# Patient Record
Sex: FEMALE | Race: WHITE
[De-identification: names, ages, dates, MRNs, and addresses within clinical notes are randomized per-mention and may not be internally consistent; named-entity substitution may affect disease eponyms.]

---
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
## 2019-12-21 ENCOUNTER — RX RENEWAL (OUTPATIENT)
Age: 19
End: 2019-12-21

## 2024-01-04 ENCOUNTER — APPOINTMENT (OUTPATIENT)
Dept: CARDIOLOGY | Facility: CLINIC | Age: 24
End: 2024-01-04
Payer: COMMERCIAL

## 2024-01-04 VITALS
HEIGHT: 61 IN | OXYGEN SATURATION: 99 % | HEART RATE: 97 BPM | BODY MASS INDEX: 19.63 KG/M2 | DIASTOLIC BLOOD PRESSURE: 82 MMHG | WEIGHT: 104 LBS | SYSTOLIC BLOOD PRESSURE: 116 MMHG

## 2024-01-04 PROCEDURE — 99214 OFFICE O/P EST MOD 30 MIN: CPT | Mod: 25

## 2024-01-04 PROCEDURE — 93000 ELECTROCARDIOGRAM COMPLETE: CPT

## 2024-01-04 NOTE — HISTORY OF PRESENT ILLNESS
[FreeTextEntry1] : 22 y/o here to establish care ------------------- previously seen by Dr. Bowman in  reviewed note.  Seen for chest pain, attributed to costochondritis. also noted to have hyperlipidemia  HDL 56  TG 83 changes in lifestyle reccomended. -------------------  PCP referred here for hyperlipidemia.  Reviewed pt's cholesterol levels a few days ago:  HDL 82 TC 3.4  VLD 23 nonHDL 194 ASCVD risk  - not calculatable for age 23y assuming minimum age for calculation 40y, ASCVD risk =3.1%.  Pt had tried a  vegan diet for 2 yrs. in an attempt to lower cholesterol. Stopped Vegan 2 months ago, however even now has minimal meat servings - once every 2 weeks, occasional dairy,  no chest pain, dyspnea, palpitations able to exercise most days of week w/o symptoms. does pilates, no intense aerobic activity.  only on prilosec for GERD 40 mg daily  remote hx craniosystosis surgery at 3 months. ============ Reviewed family hx of CAD:  Dad - high cholesterol on meds for 10 yrs started at age 48 also paternal uncle age 52 pat grandfather 59 MI  from [place]. maternal uncle high calcium score on cholesterol meds. maternal grandmother 70s MI.  Pt states her mother, alley gilliam-3- is my pt. I reviewed her mother & her relatives family hx of CAD. MIAN Gilliam's brother had a high calcium score. though no history of  PCI. ============  Works in special education. seated during most of day pilates & wts not much cardio.  ECG NSR no ischemic changes.  -------------------

## 2024-01-04 NOTE — ASSESSMENT
[FreeTextEntry1] : A/P: *hyperlpidimeia *Atypical chest pain.  -conronary CT angio to exclude presence of obstructive CAD & determine burden of calcified & noncalcified plaque. -Emailed pt on diet & cholesterol at. [email or web address]  Return 2 weeks.

## 2024-01-18 ENCOUNTER — APPOINTMENT (OUTPATIENT)
Dept: CARDIOLOGY | Facility: CLINIC | Age: 24
End: 2024-01-18

## 2024-02-02 ENCOUNTER — APPOINTMENT (OUTPATIENT)
Dept: CT IMAGING | Facility: CLINIC | Age: 24
End: 2024-02-02

## 2024-02-12 ENCOUNTER — NON-APPOINTMENT (OUTPATIENT)
Age: 24
End: 2024-02-12

## 2024-04-24 ENCOUNTER — EMERGENCY (EMERGENCY)
Facility: HOSPITAL | Age: 24
LOS: 1 days | Discharge: HOME | End: 2024-04-24
Attending: EMERGENCY MEDICINE | Admitting: EMERGENCY MEDICINE
Payer: COMMERCIAL

## 2024-04-24 VITALS
SYSTOLIC BLOOD PRESSURE: 135 MMHG | OXYGEN SATURATION: 98 % | HEART RATE: 117 BPM | TEMPERATURE: 98 F | RESPIRATION RATE: 14 BRPM | DIASTOLIC BLOOD PRESSURE: 93 MMHG

## 2024-04-24 VITALS
RESPIRATION RATE: 16 BRPM | OXYGEN SATURATION: 98 % | SYSTOLIC BLOOD PRESSURE: 112 MMHG | DIASTOLIC BLOOD PRESSURE: 70 MMHG | HEART RATE: 94 BPM

## 2024-04-24 LAB
ALBUMIN SERPL ELPH-MCNC: 4.8 G/DL — SIGNIFICANT CHANGE UP (ref 3.3–5)
ALP SERPL-CCNC: 78 U/L — SIGNIFICANT CHANGE UP (ref 40–120)
ALT FLD-CCNC: 14 U/L — SIGNIFICANT CHANGE UP (ref 4–33)
ANION GAP SERPL CALC-SCNC: 13 MMOL/L — SIGNIFICANT CHANGE UP (ref 7–14)
APTT BLD: 30.2 SEC — SIGNIFICANT CHANGE UP (ref 24.5–35.6)
AST SERPL-CCNC: 16 U/L — SIGNIFICANT CHANGE UP (ref 4–32)
BASOPHILS # BLD AUTO: 0.04 K/UL — SIGNIFICANT CHANGE UP (ref 0–0.2)
BASOPHILS NFR BLD AUTO: 0.6 % — SIGNIFICANT CHANGE UP (ref 0–2)
BILIRUB SERPL-MCNC: 0.3 MG/DL — SIGNIFICANT CHANGE UP (ref 0.2–1.2)
BUN SERPL-MCNC: 8 MG/DL — SIGNIFICANT CHANGE UP (ref 7–23)
CALCIUM SERPL-MCNC: 9.5 MG/DL — SIGNIFICANT CHANGE UP (ref 8.4–10.5)
CHLORIDE SERPL-SCNC: 102 MMOL/L — SIGNIFICANT CHANGE UP (ref 98–107)
CO2 SERPL-SCNC: 25 MMOL/L — SIGNIFICANT CHANGE UP (ref 22–31)
CREAT SERPL-MCNC: 0.71 MG/DL — SIGNIFICANT CHANGE UP (ref 0.5–1.3)
EGFR: 122 ML/MIN/1.73M2 — SIGNIFICANT CHANGE UP
EOSINOPHIL # BLD AUTO: 0.23 K/UL — SIGNIFICANT CHANGE UP (ref 0–0.5)
EOSINOPHIL NFR BLD AUTO: 3.4 % — SIGNIFICANT CHANGE UP (ref 0–6)
FLUAV AG NPH QL: SIGNIFICANT CHANGE UP
FLUBV AG NPH QL: SIGNIFICANT CHANGE UP
GLUCOSE SERPL-MCNC: 82 MG/DL — SIGNIFICANT CHANGE UP (ref 70–99)
HCG SERPL-ACNC: <1 MIU/ML — SIGNIFICANT CHANGE UP
HCT VFR BLD CALC: 38.5 % — SIGNIFICANT CHANGE UP (ref 34.5–45)
HGB BLD-MCNC: 13.4 G/DL — SIGNIFICANT CHANGE UP (ref 11.5–15.5)
IANC: 3.86 K/UL — SIGNIFICANT CHANGE UP (ref 1.8–7.4)
IMM GRANULOCYTES NFR BLD AUTO: 0.3 % — SIGNIFICANT CHANGE UP (ref 0–0.9)
INR BLD: 1.02 RATIO — SIGNIFICANT CHANGE UP (ref 0.85–1.18)
LYMPHOCYTES # BLD AUTO: 2.28 K/UL — SIGNIFICANT CHANGE UP (ref 1–3.3)
LYMPHOCYTES # BLD AUTO: 33.9 % — SIGNIFICANT CHANGE UP (ref 13–44)
MCHC RBC-ENTMCNC: 28.9 PG — SIGNIFICANT CHANGE UP (ref 27–34)
MCHC RBC-ENTMCNC: 34.8 GM/DL — SIGNIFICANT CHANGE UP (ref 32–36)
MCV RBC AUTO: 83 FL — SIGNIFICANT CHANGE UP (ref 80–100)
MONOCYTES # BLD AUTO: 0.3 K/UL — SIGNIFICANT CHANGE UP (ref 0–0.9)
MONOCYTES NFR BLD AUTO: 4.5 % — SIGNIFICANT CHANGE UP (ref 2–14)
NEUTROPHILS # BLD AUTO: 3.86 K/UL — SIGNIFICANT CHANGE UP (ref 1.8–7.4)
NEUTROPHILS NFR BLD AUTO: 57.3 % — SIGNIFICANT CHANGE UP (ref 43–77)
NRBC # BLD: 0 /100 WBCS — SIGNIFICANT CHANGE UP (ref 0–0)
NRBC # FLD: 0 K/UL — SIGNIFICANT CHANGE UP (ref 0–0)
PLATELET # BLD AUTO: 413 K/UL — HIGH (ref 150–400)
POTASSIUM SERPL-MCNC: 4 MMOL/L — SIGNIFICANT CHANGE UP (ref 3.5–5.3)
POTASSIUM SERPL-SCNC: 4 MMOL/L — SIGNIFICANT CHANGE UP (ref 3.5–5.3)
PROT SERPL-MCNC: 7.8 G/DL — SIGNIFICANT CHANGE UP (ref 6–8.3)
PROTHROM AB SERPL-ACNC: 11.5 SEC — SIGNIFICANT CHANGE UP (ref 9.5–13)
RBC # BLD: 4.64 M/UL — SIGNIFICANT CHANGE UP (ref 3.8–5.2)
RBC # FLD: 12.2 % — SIGNIFICANT CHANGE UP (ref 10.3–14.5)
RSV RNA NPH QL NAA+NON-PROBE: SIGNIFICANT CHANGE UP
SARS-COV-2 RNA SPEC QL NAA+PROBE: SIGNIFICANT CHANGE UP
SODIUM SERPL-SCNC: 140 MMOL/L — SIGNIFICANT CHANGE UP (ref 135–145)
WBC # BLD: 6.73 K/UL — SIGNIFICANT CHANGE UP (ref 3.8–10.5)
WBC # FLD AUTO: 6.73 K/UL — SIGNIFICANT CHANGE UP (ref 3.8–10.5)

## 2024-04-24 PROCEDURE — 70450 CT HEAD/BRAIN W/O DYE: CPT | Mod: 26,MC

## 2024-04-24 PROCEDURE — 93010 ELECTROCARDIOGRAM REPORT: CPT

## 2024-04-24 PROCEDURE — 99284 EMERGENCY DEPT VISIT MOD MDM: CPT

## 2024-04-24 RX ORDER — METOCLOPRAMIDE HCL 10 MG
10 TABLET ORAL ONCE
Refills: 0 | Status: COMPLETED | OUTPATIENT
Start: 2024-04-24 | End: 2024-04-24

## 2024-04-24 RX ORDER — KETOROLAC TROMETHAMINE 30 MG/ML
15 SYRINGE (ML) INJECTION ONCE
Refills: 0 | Status: DISCONTINUED | OUTPATIENT
Start: 2024-04-24 | End: 2024-04-24

## 2024-04-24 RX ORDER — SODIUM CHLORIDE 9 MG/ML
1000 INJECTION INTRAMUSCULAR; INTRAVENOUS; SUBCUTANEOUS ONCE
Refills: 0 | Status: COMPLETED | OUTPATIENT
Start: 2024-04-24 | End: 2024-04-24

## 2024-04-24 RX ADMIN — Medication 10 MILLIGRAM(S): at 12:00

## 2024-04-24 RX ADMIN — SODIUM CHLORIDE 1000 MILLILITER(S): 9 INJECTION INTRAMUSCULAR; INTRAVENOUS; SUBCUTANEOUS at 12:01

## 2024-04-24 RX ADMIN — Medication 15 MILLIGRAM(S): at 12:00

## 2024-04-24 NOTE — ED PROVIDER NOTE - NSFOLLOWUPINSTRUCTIONS_ED_ALL_ED_FT
- Please follow up with your Neurologist within 1-2 weeks. Bring your results from today.    - Tylenol up to 650 mg every 6 hours as needed for pain and/or Ibuprofen up to 400 mg every 6 hours as needed for pain.    - Take your prescribed medications for headaches as indicated.    - Stay well hydrated.     - Be sure to return to the ED if you develop new, worsening, or any distressing symptoms.

## 2024-04-24 NOTE — ED PROVIDER NOTE - PHYSICAL EXAMINATION
Physical Exam:   GENERAL: no acute distress, non-toxic appearing  HEENT: normal conjunctiva, oral mucosa moist  CARDIAC: regular rate and regular rhythm, normal S1 and S2, no appreciable murmurs  PULM: no increased work of breathing  GI: abdomen nondistended, soft, nontender  : no suprapubic tenderness  NEURO: alert and oriented x 3, normal speech, CN's 3-12 intact, EOMI without nystagmus, CLAUDIA, 5/5 strength all extremities, finger to nose intact  MSK: no visible deformities, no peripheral edema  SKIN: no visible rashes  PSYCH: appropriate mood and affect

## 2024-04-24 NOTE — ED PROVIDER NOTE - OBJECTIVE STATEMENT
23F PMH migraines (on a triptan, follows with neuro) presents to the ED with 2 weeks of progressing migraines. Mostly R sided, throbbing headache with some R facial parasthesias, not improved with meds she's prescribed for migraines. Pt reports some slight blurry vision intermittently, and sometimes the headache goes to the L side of her head. Pt denies any weakness/numbness/tingling in extremities, pt able to ambulate into the ED today. Pt having difficulty doing school work over the past couple weeks because of headache. Pt sent to ED by neurologist for evaluation. Pt didn't take any OTC medications pta. Denies any fevers/chills, uri sxs, cough, chest pain/sob/palpitations, abd pain, diarrhea, dark/bloody stools, urinary sxs.

## 2024-04-24 NOTE — ED PROVIDER NOTE - NSPTACCESSSVCSAPPTDETAILS_ED_ALL_ED_FT
f/u within 1 month with Neurologist to establish care for migraine headaches (pt has neurologist though wants a new one)

## 2024-04-24 NOTE — ED ADULT NURSE NOTE - OBJECTIVE STATEMENT
Pt received to intake. pt is AxO 3 and ambulatory. Pt c/o migraines x 2 weeks. pt states her headaches got worse today and was told by her neurologist to come for  CT. pt respirations even and unlabored. pt endorses no relief with use of prescribed medications for her headaches. Pt endorses occasional blurred vision. no other neuro deficits noted on assessment. Pt denies chest pain, SOB, n/v/d, abdominal pain, or fever like symptoms. Phx migraines, eosinophilic esophagitis, GERD. pt has a 20G tot he left AC. pt medicated as prescribed. All labs sent ot the lab. pt plan of care ongoing.

## 2024-04-24 NOTE — ED PROVIDER NOTE - CLINICAL SUMMARY MEDICAL DECISION MAKING FREE TEXT BOX
Talia: Likely migraine flare not improving with meds at home. Sent in by neuro. Will obtain imaging, check labs, give HA cocktail. Reassess.

## 2024-04-24 NOTE — ED PROVIDER NOTE - ATTENDING CONTRIBUTION TO CARE
DR. FREDERICK, ATTENDING MD-  I performed a face to face bedside interview with the patient regarding history of present illness, review of symptoms and past medical history. I completed an independent physical exam.  I have discussed the patient's plan of care with the fellow.  Documentation as above in the note.    22 y/o female h/o migraines with persistent ha x2 wks not responding to typical home meds.  No red flags, however, given prolonged sx will obtain ct head give pain med reassess.

## 2024-04-24 NOTE — ED ADULT NURSE NOTE - NSFALLUNIVINTERV_ED_ALL_ED
Bed/Stretcher in lowest position, wheels locked, appropriate side rails in place/Call bell, personal items and telephone in reach/Instruct patient to call for assistance before getting out of bed/chair/stretcher/Non-slip footwear applied when patient is off stretcher/[place] to call system/Physically safe environment - no spills, clutter or unnecessary equipment/Purposeful proactive rounding/Room/bathroom lighting operational, light cord in reach

## 2024-04-24 NOTE — ED ADULT TRIAGE NOTE - CHIEF COMPLAINT QUOTE
Patient c/o headache associated with blurry vision and R facial numbness x 3 weeks, sent to ED by neurologist. Reports nausea/vomiting last night and dizziness right now. Denies fever, chest pain, SOB. Phx migraines, eosinophilic esophagitis, GERD

## 2024-04-24 NOTE — ED PROVIDER NOTE - PATIENT PORTAL LINK FT
You can access the FollowMyHealth Patient Portal offered by [facility] by registering at the following website: [email or web address]. By joining [facility]’s FollowMyHealth portal, you will also be able to view your health information using other applications (apps) compatible with our system.

## 2024-04-30 ENCOUNTER — OFFICE (OUTPATIENT)
Dept: URBAN - METROPOLITAN AREA CLINIC 109 | Facility: CLINIC | Age: 24
Setting detail: OPHTHALMOLOGY
End: 2024-04-30
Payer: COMMERCIAL

## 2024-04-30 ENCOUNTER — TRANSCRIPTION ENCOUNTER (OUTPATIENT)
Age: 24
End: 2024-04-30

## 2024-04-30 ENCOUNTER — APPOINTMENT (OUTPATIENT)
Dept: OTOLARYNGOLOGY | Facility: CLINIC | Age: 24
End: 2024-04-30
Payer: COMMERCIAL

## 2024-04-30 VITALS
HEIGHT: 62 IN | DIASTOLIC BLOOD PRESSURE: 83 MMHG | HEART RATE: 82 BPM | WEIGHT: 100 LBS | OXYGEN SATURATION: 99 % | BODY MASS INDEX: 18.4 KG/M2 | SYSTOLIC BLOOD PRESSURE: 116 MMHG

## 2024-04-30 DIAGNOSIS — G43.109: ICD-10-CM

## 2024-04-30 DIAGNOSIS — R51.9 HEADACHE, UNSPECIFIED: ICD-10-CM

## 2024-04-30 DIAGNOSIS — J34.89 OTHER SPECIFIED DISORDERS OF NOSE AND NASAL SINUSES: ICD-10-CM

## 2024-04-30 DIAGNOSIS — H53.423: ICD-10-CM

## 2024-04-30 PROCEDURE — 92004 COMPRE OPH EXAM NEW PT 1/>: CPT | Performed by: OPHTHALMOLOGY

## 2024-04-30 PROCEDURE — 92083 EXTENDED VISUAL FIELD XM: CPT | Performed by: OPHTHALMOLOGY

## 2024-04-30 PROCEDURE — 31231 NASAL ENDOSCOPY DX: CPT

## 2024-04-30 PROCEDURE — 99204 OFFICE O/P NEW MOD 45 MIN: CPT | Mod: 25

## 2024-04-30 RX ORDER — OMEPRAZOLE MAGNESIUM 40 MG/1
40 CAPSULE, DELAYED RELEASE ORAL TWICE DAILY
Refills: 0 | Status: ACTIVE | COMMUNITY

## 2024-04-30 NOTE — END OF VISIT
[FreeTextEntry3] : I personally saw and examined  the patient in detail.  I spoke to SHIRIN Ponce regarding the assessment and plan of care. I performed the procedures and relevant physical exam.  I have reviewed the above assessment and plan of care and I agree.  I have made changes to the body of the note wherever necessary and appropriate

## 2024-04-30 NOTE — PROCEDURE
[FreeTextEntry6] : Procedure performed: Nasal Endoscopy- Diagnostic   Pre-op indication(s): nasal congestion  Post-op indication(s): nasal congestion    Verbal and/or written consent obtained from patient  Anterior rhinoscopy insufficient to account for symptoms  Scope #: 3,  flexible fiber optic telescope   The scope was introduced in the nasal passage between the middle and inferior turbinates to exam the inferior portion of the middle meatus and the fontanelle, as well as the maxillary ostia.  Next, the scope was passed medically and posteriorly to the middle turbinates to examine the sphenoethmoid recess and the superior turbinate region.    Upon visualization the finders are as follows:   Nasal Septum: sigmoidal septal deviation   Bilateral - Mucosa: boggy turbinates, Mucous: scant, Polyp: not seen, Inferior Turbinate: boggy, Middle Turbinate: normal, Superior Turbinate: normal, Inferior Meatus: narrow, Middle Meatus: narrow, Super Meatus:normal, Sphenoethmoidal Recess: clear

## 2024-04-30 NOTE — ASSESSMENT
[FreeTextEntry1] : 23 year old female with hx of rhinoplasty and refractory debilitating constant migraines, presents after MRI indicated sinuses. No polyps seen on scope. these sound like migranes however also may have sinusitis, and sinusitis may be a trigger for the migraine. will treat and see if improved.  - We will proceed with a regiment of decongestants, antibiotics and irrigation to try to break the cycle of inflation and obstruction. this will treat the acute symptoms and will hopefully allow for maintenance therapy to reach disease areas and avoid further intervention. - Will start Flonase. A topical steroid reduce mucosal swelling, illustrated appropriate use and how to reduce the risk of bleeding    - Nasal irrigation and showed how to use it to maximize effectiveness - allergy avoidance and precautions  - follow up in 1 month  - will bring in MRI disc to review images and report  - will consider ordering CT scan of sinuses for further evaluation if symptoms persists

## 2024-04-30 NOTE — PHYSICAL EXAM
[Nasal Endoscopy Performed] : nasal endoscopy was performed, see procedure section for findings [Normal] : normal appearance, well groomed, well nourished, and in no acute distress

## 2024-04-30 NOTE — HISTORY OF PRESENT ILLNESS
[de-identified] : 23 year old female with hx of migraines [facility] and was referred to ENT for evaluation of sinuses. Got a rhinoplasty 4 years ago by Dr Ureña, for cosmetics. Gets intermittent stuffy nose but states its not bothersome. Got allergy tested which indicated she was allergic to pollen, dust, grass and trees. Is taking alllegra for allergies but unsure if it is helping. Gets pressure at her cheeks with migraines, worse on right side. Sometimes also gets pressure at forehead and nose with migraines. Migraine medications do not help, migraine cocktail helped her in ER last week.  States with migraines, nose and ears get very clogged and stuffed. Cannot hear from ear on side of migraine for days at a time. Has tried flonase in the past but not using currently. Has had 2-3 sinus infections in last 12 months requiring antibiotics, has not taken steroids. Most recent was 2 months ago. Has had migraines daily for last month.

## 2024-04-30 NOTE — CONSULT LETTER
[Please see my note below.] : Please see my note below. [FreeTextEntry1] : Dear Dr. JUDITH MCCRAY    I had the pleasure of evaluating your patient JENNIFER FERREIRA, thank you for allowing us to participate in their care. please see full note detailing our visit below.  If you have any questions, please do not hesitate to call me and I would be happy to discuss further.       Librado Arredondo M.D.   Attending Physician,     Department of Otolaryngology - Head and Neck Surgery   [facility]    Office: [phone]   Fax: [phone]

## 2024-05-02 ENCOUNTER — APPOINTMENT (OUTPATIENT)
Dept: NEUROLOGY | Facility: CLINIC | Age: 24
End: 2024-05-02
Payer: COMMERCIAL

## 2024-05-02 DIAGNOSIS — G43.909 MIGRAINE, UNSPECIFIED, NOT INTRACTABLE, W/OUT STATUS MIGRAINOSUS: ICD-10-CM

## 2024-05-02 PROCEDURE — 99203 OFFICE O/P NEW LOW 30 MIN: CPT

## 2024-05-02 NOTE — HISTORY OF PRESENT ILLNESS
[FreeTextEntry1] : [This is a telehealth 2-way video visit] Verbal consent given on May 02, 2024  at  9:30AM by JENNIFER FERREIRA Patient location: [address] [place] [address] Provider location: Medical office   \[facility] [place] [facility]\"" (initial visit May 02, 2024)- JENNIFER FERREIRA is a 23 year old woman self-referred for migraine headaches.   She would usually get migraine headaches once a year.  In the last 6 months, they have become more frequent, daily in the last month.  She saw a neurologist with [facility], she was prescribed rizatriptan- caused s/e- chest pains and n/v. She was switched to ubrelvy and increased to 100 and still did not work. She was in ED with headache last week for severe headache, CTH was normal ([facility]).  She had a brain MRI 1 month ago and it reportedly normal and reported "sinus disease".  Also saw Ophthalmologist 2 days ago, Dr Simpson ([facility]), and everything was okay. Saw ENT, Dr Librado Arredondo, prescribed steroids for sinusitis.- has not started yet.   Headaches: around the right eye and radiate to temple and top of head. + photophobia and phonophobia. Extremely nauseous and little dizzy. Right eye can get blurry. no visual/sensory/motor auras. no speech disturbances.   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

## 2024-05-02 NOTE — REASON FOR VISIT
[Initial Evaluation] : an initial evaluation [FreeTextEntry1] : Migraines 11/30 hgb 10.3, hct 30.4, bun 55, creat 4.69, k 5.5, alb 3.2, alk phos 122, eGFR 11, probnp [id number]

## 2024-05-02 NOTE — ASSESSMENT
[FreeTextEntry1] : Assessment/Plan:  23 year old female w/ hx of migraine headaches, now experiencing worsening intensity and frequency of headaches c/w status migrainous.   Recommendation:- [] Over the counter preventative therapies discussed, which include Magnesium 400 mg QD (discussed potential side effect of diarrhea) and riboflavin 400 mg QD. [] Agree with starting prednisone taper (prescribed by ENT) [] Will prescribe Nurtec 75 mg QD PRN for headaches. If she tolerates it well, can consider every other day dosing as preventative.  [] Pt to send us disc and report of recent brain MRI [] If headaches continue to persist, may consider inpatient DHE therapy.    Headache education provided: [] Stay well hydrated [] Limit excessive caffeine and alcohol intake [] Maintain good sleep hygiene. Follow a consistent sleep and wake schedule.  [] Practice good eating habits. Avoid skipping meals.  [] Try to avoid any known triggers [] Avoid excessive use of over the counter pain medications, as they can cause medication overuse headaches  [] Keep a headache diary [] Relaxation techniques, biofeedback, massage therapy, acupunctures, and heating pads may be effective   Return to clinic 4-6 weeks (in person)  The above plan was discussed with JENNIFER ALANIZLESON in great detail.  JENNIFER FERREIRA verbalized understanding and agrees with plan as detailed above. Patient was provided education and counselling on current diagnosis/symptoms. She was advised to call our clinic at [phone] for any new or worsening symptoms, or with any questions or concerns. JENNIFER FERREIRA expressed understanding and all her questions/concerns were addressed.  Bonita Zavaleta M.D

## 2024-05-07 ENCOUNTER — APPOINTMENT (OUTPATIENT)
Dept: CARDIOLOGY | Facility: CLINIC | Age: 24
End: 2024-05-07
Payer: COMMERCIAL

## 2024-05-07 ENCOUNTER — NON-APPOINTMENT (OUTPATIENT)
Age: 24
End: 2024-05-07

## 2024-05-07 VITALS
WEIGHT: 99 LBS | OXYGEN SATURATION: 99 % | HEART RATE: 105 BPM | SYSTOLIC BLOOD PRESSURE: 104 MMHG | DIASTOLIC BLOOD PRESSURE: 66 MMHG | HEIGHT: 62 IN | BODY MASS INDEX: 18.22 KG/M2

## 2024-05-07 DIAGNOSIS — I47.10 SUPRAVENTRICULAR TACHYCARDIA, UNSPECIFIED: ICD-10-CM

## 2024-05-07 PROCEDURE — 99214 OFFICE O/P EST MOD 30 MIN: CPT

## 2024-05-07 PROCEDURE — 93000 ELECTROCARDIOGRAM COMPLETE: CPT

## 2024-05-07 PROCEDURE — G2211 COMPLEX E/M VISIT ADD ON: CPT

## 2024-05-07 RX ORDER — AMOXICILLIN AND CLAVULANATE POTASSIUM 875; 125 MG/1; MG/1
875-125 TABLET, COATED ORAL
Qty: 20 | Refills: 0 | Status: DISCONTINUED | COMMUNITY
Start: 2024-04-30 | End: 2024-05-07

## 2024-05-07 RX ORDER — NORGESTREL AND ETHINYL ESTRADIOL 0.3-0.03MG
0.3-3 KIT ORAL DAILY
Qty: 3 | Refills: 3 | Status: DISCONTINUED | COMMUNITY
Start: 2018-12-17 | End: 2024-05-07

## 2024-05-07 RX ORDER — LORATADINE 5 MG/5 ML
0.05 SOLUTION, ORAL ORAL
Qty: 1 | Refills: 0 | Status: DISCONTINUED | COMMUNITY
Start: 2024-04-30 | End: 2024-05-07

## 2024-05-07 RX ORDER — NORGESTREL AND ETHINYL ESTRADIOL 0.3-0.03MG
0.3-3 KIT ORAL
Refills: 0 | Status: DISCONTINUED | COMMUNITY
Start: 2018-05-21 | End: 2024-05-07

## 2024-05-07 RX ORDER — FLUTICASONE PROPIONATE 220 UG/1
220 AEROSOL, METERED RESPIRATORY (INHALATION)
Refills: 0 | Status: DISCONTINUED | COMMUNITY
End: 2024-05-07

## 2024-05-07 RX ORDER — PREDNISONE 10 MG/1
10 TABLET ORAL
Qty: 27 | Refills: 0 | Status: DISCONTINUED | COMMUNITY
Start: 2024-04-30 | End: 2024-05-07

## 2024-05-07 NOTE — ASSESSMENT
[FreeTextEntry1] : A/P:  *SVT -records from [place] -Echo to confirm no structural abnormalities  *Chest pain -doubt cardiac etiology -Echo orded/  *hyperlipidemia - - CT coronary angio.

## 2024-05-07 NOTE — HISTORY OF PRESENT ILLNESS
[FreeTextEntry1] : [id number]  JENNIFER FERREIRA  Aug  4 2000 [phone]   saw neuro for migraine also saw ENT for possible sinus infection prednisone & augmentin was prescribed for sinus infection within 30 minutes had hives throughout body, throat swelled. Mom took to [facility]  epi given & benadyrl & pepside symptoms resolved HRs went to 180s abrupt after symptoms resolved. Was told to bear down & symptoms resolved. Was kept overnight for observation.  Since discharge chest discomfort L sided & in shoulder 1-2 days after discharge constant discomfort. no changes w/ palpation, inspiration, breathing, worse w/lying flat.  No dyspnea, no palpitations.

## 2024-05-10 ENCOUNTER — APPOINTMENT (OUTPATIENT)
Dept: CARDIOLOGY | Facility: CLINIC | Age: 24
End: 2024-05-10

## 2024-05-21 ENCOUNTER — APPOINTMENT (OUTPATIENT)
Dept: CARDIOLOGY | Facility: CLINIC | Age: 24
End: 2024-05-21

## 2024-06-03 ENCOUNTER — NON-APPOINTMENT (OUTPATIENT)
Age: 24
End: 2024-06-03

## 2024-06-04 ENCOUNTER — APPOINTMENT (OUTPATIENT)
Dept: OTOLARYNGOLOGY | Facility: CLINIC | Age: 24
End: 2024-06-04

## 2024-06-13 ENCOUNTER — EMERGENCY (EMERGENCY)
Facility: HOSPITAL | Age: 24
LOS: 1 days | Discharge: HOME | End: 2024-06-13
Attending: EMERGENCY MEDICINE | Admitting: EMERGENCY MEDICINE
Payer: COMMERCIAL

## 2024-06-13 VITALS
OXYGEN SATURATION: 100 % | SYSTOLIC BLOOD PRESSURE: 142 MMHG | DIASTOLIC BLOOD PRESSURE: 85 MMHG | TEMPERATURE: 98 F | RESPIRATION RATE: 16 BRPM

## 2024-06-13 VITALS
SYSTOLIC BLOOD PRESSURE: 140 MMHG | DIASTOLIC BLOOD PRESSURE: 84 MMHG | TEMPERATURE: 98 F | OXYGEN SATURATION: 98 % | RESPIRATION RATE: 16 BRPM | HEART RATE: 115 BPM

## 2024-06-13 LAB
ALBUMIN SERPL ELPH-MCNC: 4.5 G/DL — SIGNIFICANT CHANGE UP (ref 3.3–5)
ALP SERPL-CCNC: 68 U/L — SIGNIFICANT CHANGE UP (ref 40–120)
ALT FLD-CCNC: 24 U/L — SIGNIFICANT CHANGE UP (ref 4–33)
ANION GAP SERPL CALC-SCNC: 8 MMOL/L — SIGNIFICANT CHANGE UP (ref 7–14)
AST SERPL-CCNC: 72 U/L — HIGH (ref 4–32)
BASOPHILS # BLD AUTO: 0.03 K/UL — SIGNIFICANT CHANGE UP (ref 0–0.2)
BASOPHILS NFR BLD AUTO: 0.4 % — SIGNIFICANT CHANGE UP (ref 0–2)
BILIRUB SERPL-MCNC: 0.4 MG/DL — SIGNIFICANT CHANGE UP (ref 0.2–1.2)
BUN SERPL-MCNC: 8 MG/DL — SIGNIFICANT CHANGE UP (ref 7–23)
CALCIUM SERPL-MCNC: 9.5 MG/DL — SIGNIFICANT CHANGE UP (ref 8.4–10.5)
CHLORIDE SERPL-SCNC: 103 MMOL/L — SIGNIFICANT CHANGE UP (ref 98–107)
CO2 SERPL-SCNC: 25 MMOL/L — SIGNIFICANT CHANGE UP (ref 22–31)
CREAT SERPL-MCNC: 0.71 MG/DL — SIGNIFICANT CHANGE UP (ref 0.5–1.3)
EGFR: 122 ML/MIN/1.73M2 — SIGNIFICANT CHANGE UP
EOSINOPHIL # BLD AUTO: 0.09 K/UL — SIGNIFICANT CHANGE UP (ref 0–0.5)
EOSINOPHIL NFR BLD AUTO: 1.3 % — SIGNIFICANT CHANGE UP (ref 0–6)
GLUCOSE SERPL-MCNC: 82 MG/DL — SIGNIFICANT CHANGE UP (ref 70–99)
HCT VFR BLD CALC: 37.8 % — SIGNIFICANT CHANGE UP (ref 34.5–45)
HGB BLD-MCNC: 12.7 G/DL — SIGNIFICANT CHANGE UP (ref 11.5–15.5)
IANC: 4.46 K/UL — SIGNIFICANT CHANGE UP (ref 1.8–7.4)
IMM GRANULOCYTES NFR BLD AUTO: 0.4 % — SIGNIFICANT CHANGE UP (ref 0–0.9)
LYMPHOCYTES # BLD AUTO: 2.21 K/UL — SIGNIFICANT CHANGE UP (ref 1–3.3)
LYMPHOCYTES # BLD AUTO: 30.7 % — SIGNIFICANT CHANGE UP (ref 13–44)
MAGNESIUM SERPL-MCNC: 2.2 MG/DL — SIGNIFICANT CHANGE UP (ref 1.6–2.6)
MCHC RBC-ENTMCNC: 28.5 PG — SIGNIFICANT CHANGE UP (ref 27–34)
MCHC RBC-ENTMCNC: 33.6 GM/DL — SIGNIFICANT CHANGE UP (ref 32–36)
MCV RBC AUTO: 84.8 FL — SIGNIFICANT CHANGE UP (ref 80–100)
MONOCYTES # BLD AUTO: 0.38 K/UL — SIGNIFICANT CHANGE UP (ref 0–0.9)
MONOCYTES NFR BLD AUTO: 5.3 % — SIGNIFICANT CHANGE UP (ref 2–14)
NEUTROPHILS # BLD AUTO: 4.46 K/UL — SIGNIFICANT CHANGE UP (ref 1.8–7.4)
NEUTROPHILS NFR BLD AUTO: 61.9 % — SIGNIFICANT CHANGE UP (ref 43–77)
NRBC # BLD: 0 /100 WBCS — SIGNIFICANT CHANGE UP (ref 0–0)
NRBC # FLD: 0 K/UL — SIGNIFICANT CHANGE UP (ref 0–0)
PHOSPHATE SERPL-MCNC: 2.8 MG/DL — SIGNIFICANT CHANGE UP (ref 2.5–4.5)
PLATELET # BLD AUTO: 420 K/UL — HIGH (ref 150–400)
POTASSIUM SERPL-MCNC: 4.1 MMOL/L — SIGNIFICANT CHANGE UP (ref 3.5–5.3)
POTASSIUM SERPL-SCNC: 4.1 MMOL/L — SIGNIFICANT CHANGE UP (ref 3.5–5.3)
PROT SERPL-MCNC: 7.4 G/DL — SIGNIFICANT CHANGE UP (ref 6–8.3)
RBC # BLD: 4.46 M/UL — SIGNIFICANT CHANGE UP (ref 3.8–5.2)
RBC # FLD: 11.7 % — SIGNIFICANT CHANGE UP (ref 10.3–14.5)
SODIUM SERPL-SCNC: 136 MMOL/L — SIGNIFICANT CHANGE UP (ref 135–145)
WBC # BLD: 7.2 K/UL — SIGNIFICANT CHANGE UP (ref 3.8–10.5)
WBC # FLD AUTO: 7.2 K/UL — SIGNIFICANT CHANGE UP (ref 3.8–10.5)

## 2024-06-13 PROCEDURE — 70496 CT ANGIOGRAPHY HEAD: CPT | Mod: 26,MC

## 2024-06-13 PROCEDURE — 70498 CT ANGIOGRAPHY NECK: CPT | Mod: 26,MC

## 2024-06-13 PROCEDURE — 93010 ELECTROCARDIOGRAM REPORT: CPT

## 2024-06-13 PROCEDURE — 99223 1ST HOSP IP/OBS HIGH 75: CPT

## 2024-06-13 RX ORDER — SODIUM CHLORIDE 9 MG/ML
1000 INJECTION INTRAMUSCULAR; INTRAVENOUS; SUBCUTANEOUS
Refills: 0 | Status: DISCONTINUED | OUTPATIENT
Start: 2024-06-13 | End: 2024-06-16

## 2024-06-13 RX ORDER — METOCLOPRAMIDE HCL 10 MG
10 TABLET ORAL ONCE
Refills: 0 | Status: COMPLETED | OUTPATIENT
Start: 2024-06-13 | End: 2024-06-13

## 2024-06-13 RX ORDER — SODIUM CHLORIDE 9 MG/ML
1000 INJECTION, SOLUTION INTRAVENOUS ONCE
Refills: 0 | Status: COMPLETED | OUTPATIENT
Start: 2024-06-13 | End: 2024-06-13

## 2024-06-13 RX ORDER — DIPHENHYDRAMINE HCL 50 MG
25 CAPSULE ORAL EVERY 8 HOURS
Refills: 0 | Status: DISCONTINUED | OUTPATIENT
Start: 2024-06-13 | End: 2024-06-16

## 2024-06-13 RX ORDER — KETOROLAC TROMETHAMINE 30 MG/ML
15 SYRINGE (ML) INJECTION ONCE
Refills: 0 | Status: DISCONTINUED | OUTPATIENT
Start: 2024-06-13 | End: 2024-06-13

## 2024-06-13 RX ORDER — METOCLOPRAMIDE HCL 10 MG
10 TABLET ORAL EVERY 8 HOURS
Refills: 0 | Status: DISCONTINUED | OUTPATIENT
Start: 2024-06-13 | End: 2024-06-16

## 2024-06-13 RX ORDER — KETOROLAC TROMETHAMINE 30 MG/ML
30 SYRINGE (ML) INJECTION EVERY 8 HOURS
Refills: 0 | Status: DISCONTINUED | OUTPATIENT
Start: 2024-06-13 | End: 2024-06-13

## 2024-06-13 RX ORDER — ACETAMINOPHEN 500 MG
1000 TABLET ORAL ONCE
Refills: 0 | Status: COMPLETED | OUTPATIENT
Start: 2024-06-13 | End: 2024-06-13

## 2024-06-13 RX ORDER — MAGNESIUM SULFATE 500 MG/ML
2 VIAL (ML) INJECTION ONCE
Refills: 0 | Status: COMPLETED | OUTPATIENT
Start: 2024-06-13 | End: 2024-06-13

## 2024-06-13 RX ADMIN — Medication 400 MILLIGRAM(S): at 10:47

## 2024-06-13 RX ADMIN — Medication 15 MILLIGRAM(S): at 10:47

## 2024-06-13 RX ADMIN — SODIUM CHLORIDE 1000 MILLILITER(S): 9 INJECTION, SOLUTION INTRAVENOUS at 10:47

## 2024-06-13 RX ADMIN — Medication 10 MILLIGRAM(S): at 17:08

## 2024-06-13 RX ADMIN — Medication 25 MILLIGRAM(S): at 17:04

## 2024-06-13 RX ADMIN — Medication 30 MILLIGRAM(S): at 17:07

## 2024-06-13 RX ADMIN — SODIUM CHLORIDE 125 MILLILITER(S): 9 INJECTION INTRAMUSCULAR; INTRAVENOUS; SUBCUTANEOUS at 16:11

## 2024-06-13 RX ADMIN — Medication 10 MILLIGRAM(S): at 10:50

## 2024-06-13 RX ADMIN — Medication 150 GRAM(S): at 12:45

## 2024-06-13 RX ADMIN — Medication 125 MILLIGRAM(S): at 17:01

## 2024-06-13 NOTE — ED PROVIDER NOTE - NSFOLLOWUPINSTRUCTIONS_ED_ALL_ED_FT
You have been evaluated in the Emergency Department today for headache. Your evaluation did not show evidence of medical conditions requiring emergent intervention at this time, and your pain improved with medication in the ED.    We recommend you take 600mg ibuprofen every 6 hours or tylenol 650mg every 6 hours as needed for pain. If needed, you can alternate these medications so that you take one medication every 3 hours. For instance, at noon take ibuprofen, then at 3pm take tylenol, then at 6pm take ibuprofen.    Please follow up with your neurologist within two days.    Return to the Emergency Department if you experience worsening or uncontrolled pain, vision changes, recurrent vomiting, difficulty with normal activities, abnormal behavior, difficulty walking, numbness, weakness, or any other concerning symptoms.

## 2024-06-13 NOTE — ED CDU PROVIDER DISPOSITION NOTE - ATTENDING CONTRIBUTION TO CARE
23F p/w headache a/w R side body numbness and tingling x 2 days, with the tingling appearing 1 day later.  Pt spoke with her neurologist who referred pt to ED.  Pt placed in CDU after neuro saw pt recc MRI/MRV.  Since being in CDU pt feeling better, symptoms resolved but doesn't want to stay overnight anymore due to there being triggers for her migraines in CDU, pt prefers to go home and follow up outpt.  Pt examined, feeling better in NAD, normal neuro exam.  OK for d/c home f/u outpt neurologist.   VS:  unremarkable    GEN - NAD;   well appearing;   A+O x3   HEAD - NC/AT     ENT - PEERL, EOMI, mucous membranes    moist , no discharge      NECK: Neck supple, non-tender without lymphadenopathy, no masses, no JVD  PULM - CTA b/l,  symmetric breath sounds  COR -  normal heart sounds    ABD - , ND, NT, soft,  BACK - no CVA tenderness, nontender spine     EXTREMS - no edema, no deformity, warm and well perfused    SKIN - no rash    or bruising      NEUROLOGIC - alert, face symmetric, speech fluent, sensation nl, motor no focal deficit.

## 2024-06-13 NOTE — ED ADULT TRIAGE NOTE - CHIEF COMPLAINT QUOTE
sent by neurologist, Dr. Murrell c/o headache worsening X couple of months. pt says has been having migraines. pt tried Botox, triptan medication with no relief. endorsing right-side numbness/tingling X 2 days. endorsing nausea. last took ibuprofen at 0600am. hx migraines, GERD

## 2024-06-13 NOTE — ED CDU PROVIDER INITIAL DAY NOTE - CLINICAL SUMMARY MEDICAL DECISION MAKING FREE TEXT BOX
pt in ED with migraine consistent with her normal migraine but now with right body numbness and slight decrease in motor; pt not improved in symptoms and discussion between pt's neurologist and [facility] recommending CDU for MRI and further eval

## 2024-06-13 NOTE — ED PROVIDER NOTE - ATTENDING CONTRIBUTION TO CARE
Dr. Adair: This is a 23-year-old female with history of migraine headaches (classically right periorbital/right hemicranial headaches), referred to the emergency department by her neurologist due to classic migraine for her, now accompanied by right body numbness/tingling and slight weakness in the right body.  Patient states that the headache she has been having has been consistent with her migraine headaches in general, and overall her migraines have been very difficult to control, not improving with multiple triptans and other medications.  Patient endorsed new symptoms of right body numbness to her neurologist and was referred to the emergency department.  She states that she has been having nausea and vomiting but she denies any fevers, chest pain, shortness of breath, neck stiffness.  On exam pt overall well appearing, in NAD, heart RRR, lungs CTAB, abd NTND, extremities without swelling, strength 5/5 in left UE/LE and 4+/5 in right UE/LE and skin without rash.  Sensation decreased in right face/arm/leg.  Gait intact and normal.  EOMI/PERRLA.  Speech normal.

## 2024-06-13 NOTE — ED PROVIDER NOTE - CLINICAL SUMMARY MEDICAL DECISION MAKING FREE TEXT BOX
Patient is a 23-year-old female with a history of migraines who presents for migraines and right-sided body numbness and tingling. 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  Plan for CT head and CTA head and neck.  Plan to reach out to neurology as outpatient to see if MRI is warranted.  Plan to treat pain with IV Toradol, IV Tylenol, IV Reglan.  Plan for IV fluid due to nausea and vomiting.  Plan for reevaluation.  Most likely discharge with outpatient follow-up.

## 2024-06-13 NOTE — ED ADULT NURSE NOTE - OBJECTIVE STATEMENT
Pt endorsing having intermittent headaches for the past several months, evaluated by neurologist and instructed to come to ED for further eval, pt endorses numbness sensation to rt upper extremity, breathing even and unlabored, no dizziness, afebrile, ivl placed, labs collected and sent, medicated as ordered, will continue to monitor.

## 2024-06-13 NOTE — CONSULT NOTE ADULT - SUBJECTIVE AND OBJECTIVE BOX
Neurology - Consult Note    -  Spectra: [id number] ([facility]), [phone] ([facility])  -    HPI: Patient JENNIFER FERREIRA is a 23y (2000) wo/man with a PMHx significant for ***      Review of Systems:  INCOMPLETE   CONSTITUTIONAL: No fevers or chills  EYES AND ENT: No visual changes or no throat pain   NECK: No pain or stiffness  RESPIRATORY: No hemoptysis or shortness of breath  CARDIOVASCULAR: No chest pain or palpitations  GASTROINTESTINAL: No melena or hematochezia  GENITOURINARY: No dysuria or hematuria  NEUROLOGICAL: +As stated in HPI above  SKIN: No itching, burning, rashes, or lesions   All other review of systems is negative unless indicated above.    Allergies:  Augmentin (Unknown)      PMHx/PSHx/Family Hx: As above, otherwise see below   Migraine        Social Hx:  No current use of tobacco, alcohol, or illicit drugs  Lives with ***    Medications:  MEDICATIONS  (STANDING):  magnesium sulfate  IVPB 2 Gram(s) IV Intermittent Once    MEDICATIONS  (PRN):      Vitals:  T(C): 36.8 (06-13-24 @ 10:54), Max: 36.8 (06-13-24 @ 09:40)  HR: 108 (06-13-24 @ 10:54) (108 - 115)  BP: 131/75 (06-13-24 @ 10:54) (131/75 - 140/84)  RR: 16 (06-13-24 @ 10:54) (16 - 16)  SpO2: 99% (06-13-24 @ 10:54) (98% - 99%)    Physical Examination: INCOMPLETE  General - NAD, pleasant, cooperative   Cardiovascular - Peripheral pulses palpable, no edema  Neurologic Exam:  Mental status - Awake, Alert, Oriented to person, place, and time. Speech fluent, repetition and naming intact. Follows simple and complex commands. Attention/concentration, recent and remote memory (including registration and recall), and fund of knowledge intact    Cranial nerves:  CN II: Visual fields are full to confrontation. Fundoscopic exam is normal with sharp discs. Pupils are 4 mm and briskly reactive to light. Visual acuity is 20/20 bilaterally.  CN III, IV, VI: EOMI, no nystagmus, no ptosis  CN V: Facial sensation is intact to pinprick in all 3 divisions bilaterally.  CN VII: Face is symmetric with normal eye closure and smile.  CN VII: Hearing is normal to rubbing fingers  CN IX, X: Palate elevates symmetrically. Phonation is normal.  CN XI: Head turning and shoulder shrug are intact  CN XII: Tongue is midline with normal movements and no atrophy.    Motor - Normal bulk and tone throughout. No pronator drift of out-stretched arms.  Strength testing            Deltoid(C5)  Biceps(C6)    Triceps(C7)     Wrist Extension    Wrist Flexion (C8)     Interossei  (T1)       R            5                 5                        5                     5                              5                                      5                         5  L             5                 5                        5                     5                              5                                      5                          5              Hip Flexion(L2/3)    Hip Extension (L4/5)   Knee Flexion (L4/5/S1)    Knee Extension (L3/4)   Dorsiflexion (L4/5)   Plantar Flexion (S1)  R              5                                    5                                     5                                                     5                                              5                          5  L              5                                     5                                     5                                                     5                                              5                          5    Sensation - Light touch/temperature OR pain/vibration intact in fingers and toes     DTR's -             Biceps      Triceps     Brachioradialis      Patellar    Ankle    Toes/plantar response  R             2+             2+                  2+                       2+            2+                 Down  L              2+             2+                 2+                        2+           2+                 Down    Coordination - Rapid alternating movements and fine finger movements are intact. There is no dysmetria on finger-to-nose and heel-knee-shin. There are no abnormal or extraneous movements. Romberg?    Gait and station - Posture is normal. Gait is steady with normal steps, base, arm swing, and turning. Heel and toe walking are normal. Tandem gait is normal       Labs:                        12.7   7.20  )-----------( 420      ( 13 Jun 2024 10:30 )             37.8     06-13    136  |  103  |  8   ----------------------------<  82  4.1   |  25  |  0.71    Ca    9.5      13 Jun 2024 10:30  Phos  2.8     06-13  Mg     2.20     06-13    TPro  7.4  /  Alb  4.5  /  TBili  0.4  /  DBili  x   /  AST  72<H>  /  ALT  24  /  AlkPhos  68  06-13    CAPILLARY BLOOD GLUCOSE        LIVER FUNCTIONS - ( 13 Jun 2024 10:30 )  Alb: 4.5 g/dL / Pro: 7.4 g/dL / ALK PHOS: 68 U/L / ALT: 24 U/L / AST: 72 U/L / GGT: x               CSF:                  Radiology:     Neurology - Consult Note    -  Spectra: [phone] ([facility]), [phone] ([facility])  -    HPI: Patient JENNIFER FERREIRA is a 23y (2000) woman with a PMHx significant for migraines presenting with 4 months of 7/10 headache with associated right sided numbness. At the age of [age] patient started getting one mild headache a year. However, in february the patient woke up with a 7/10 headache that has not gone away since. The headache is isolated to the right side and is typically behind her eye but sometimes extends to the full right side of her head. SB notes that the headache is consistent she goes to bed with it at night and wakes up with it in the morning. Nothing makes it better except sometimes lying down will change the severity from a 7/10 to a 5/10. When the pain is at its worst it is a 10/10 and is associated with right face, arm and leg numbness. Patient also notes when pain is a 10/10 she experiences a feeling of heaviness in her tongue and difficulty speaking. She has not noticed any facial droop or changes in facial expression. She also denies weakness. She notes that sometimes she sees black spots in her right eye. She also notes sensitivity to both light and sound. She denies nausea and vomiting. In the past four months SB has tried multiple medications with no relief. In February she tried risatriptan, tritriptan and sumitriptan. In March/April she switched to Ubrevly for two weeks with no relief. She then switched to Nurtec for two months again with no relief. Two weeks ago she received Botox for the first time which reduced her headache severity from a 7/10 to 5/10.       Review of Systems:   CONSTITUTIONAL: No fevers or chills  EYES AND ENT: No visual changes or no throat pain   NECK: No pain or stiffness  RESPIRATORY: No hemoptysis or shortness of breath  CARDIOVASCULAR: No chest pain or palpitations  GASTROINTESTINAL: No melena or hematochezia  GENITOURINARY: No dysuria or hematuria  NEUROLOGICAL: +As stated in HPI above  SKIN: No itching, burning, rashes, or lesions   All other review of systems is negative unless indicated above.    Allergies:  Augmentin (Unknown)      PMHx/PSHx/Family Hx: As above, otherwise see below   Migraine      Medications:  MEDICATIONS  (STANDING):  magnesium sulfate  IVPB 2 Gram(s) IV Intermittent Once  Omeprazole daily    MEDICATIONS  (PRN):      Vitals:  T(C): 36.8 (06-13-24 @ 10:54), Max: 36.8 (06-13-24 @ 09:40)  HR: 108 (06-13-24 @ 10:54) (108 - 115)  BP: 131/75 (06-13-24 @ 10:54) (131/75 - 140/84)  RR: 16 (06-13-24 @ 10:54) (16 - 16)  SpO2: 99% (06-13-24 @ 10:54) (98% - 99%)    Physical Examination:  General - NAD, pleasant, cooperative   Cardiovascular - Peripheral pulses palpable, no edema  Neurologic Exam:  Mental status - Awake, Alert, Oriented to person, place, and time. Speech fluent.    Cranial nerves:  CN II: Pupils are 4 mm and briskly reactive to light.  CN III, IV, VI: EOMI, no nystagmus, no ptosis  CN V: Facial sensation is intact to pinprick in all 3 divisions bilaterally.  CN VII: Face is symmetric with normal eye closure and smile.  CN VII: Hearing is normal to rubbing fingers  CN IX, X: Palate elevates symmetrically. Phonation is normal.  CN XI: Head turning and shoulder shrug are intact  CN XII: Tongue is midline with normal movements and no atrophy.    Motor - Normal bulk and tone throughout. No pronator drift of out-stretched arms.  Strength testing            Deltoid(C5)  Biceps(C6)    Triceps(C7)       R            5                 5                        5                      L             5                 5                        5                                 Hip Flexion(L2/3)    Hip Extension (L4/5)   Knee Flexion (L4/5/S1)    Knee Extension (L3/4)   Dorsiflexion (L4/5)   Plantar Flexion (S1)  R              5                                    5                                     5                                                     5                                              5                          5  L              5                                     5                                     5                                                     5                                              5                          5    Sensation - Reduced sensation to pinprick and light touch on the right side of the body including the face and proximal and distal UE and LE.      DTR's -             Biceps      Triceps     Brachioradialis      Patellar    Ankle    Toes/plantar response  R             2+             2+                  2+                       2+            2+                 Down  L              2+             2+                 2+                        2+           2+                 Down      Gait and station - Posture is normal. Gait is steady with normal steps, base, arm swing, and turning. Heel and toe walking are normal. Tandem gait is normal       Labs:                        12.7   7.20  )-----------( 420      ( 13 Jun 2024 10:30 )             37.8     06-13    136  |  103  |  8   ----------------------------<  82  4.1   |  25  |  0.71    Ca    9.5      13 Jun 2024 10:30  Phos  2.8     06-13  Mg     2.20     06-13    TPro  7.4  /  Alb  4.5  /  TBili  0.4  /  DBili  x   /  AST  72<H>  /  ALT  24  /  AlkPhos  68  06-13    CAPILLARY BLOOD GLUCOSE        LIVER FUNCTIONS - ( 13 Jun 2024 10:30 )  Alb: 4.5 g/dL / Pro: 7.4 g/dL / ALK PHOS: 68 U/L / ALT: 24 U/L / AST: 72 U/L / GGT: x               CSF:                  Radiology:     Neurology - Consult Note    -  Spectra: [phone] ([facility]), [phone] ([facility])  -    HPI: Patient JENNIFER FERREIRA is a 23y (2000) woman with a PMHx significant for migraines presenting with 4 months of 7/10 headache with associated right sided numbness. At the age of [age] patient started getting one mild headache a year. However, in february the patient woke up with a 7/10 headache that has not gone away since. The headache is isolated to the right side and is typically behind her eye but sometimes extends to the full right side of her head. The patient notes that the headache is consistent was she goes to bed with it at night and wakes up with it in the morning. Nothing makes it better except sometimes lying down will change the severity from a 7/10 to a 5/10. When the pain is at its worst it is a 10/10 and is associated with right face, arm and leg numbness. Patient also notes when pain is a 10/10 she experiences a feeling of heaviness in her tongue and difficulty speaking. She has not noticed any facial droop or changes in facial expression. She also denies weakness. She notes that sometimes she sees black spots in her right eye. She also notes sensitivity to both light and sound. She denies nausea and vomiting. In the past four months SB has tried multiple medications with no relief. In February she tried risatriptan, tritriptan and sumitriptan. In March/April she switched to Ubrevly for two weeks with no relief. She then switched to Nurtec for two months again with no relief. Two weeks ago she received Botox for the first time which reduced her headache severity from a 7/10 to 5/10. Upon arrival to the ED, the patient received the headache cocktail of tylenol, ketoralac, and reglan. She says there was mild relief through using magnesium. ,       Review of Systems:   CONSTITUTIONAL: No fevers or chills  EYES AND ENT: No visual changes   RESPIRATORY: Noshortness of breath  CARDIOVASCULAR: No chest pain   NEUROLOGICAL: +As stated in HPI above  All other review of systems is negative unless indicated above.    Allergies:  Augmentin (Unknown)      PMHx/PSHx/Family Hx: As above, otherwise see below   Migraine      Medications:  MEDICATIONS  (STANDING):  magnesium sulfate  IVPB 2 Gram(s) IV Intermittent Once  Omeprazole daily    MEDICATIONS  (PRN):      Vitals:  T(C): 36.8 (06-13-24 @ 10:54), Max: 36.8 (06-13-24 @ 09:40)  HR: 108 (06-13-24 @ 10:54) (108 - 115)  BP: 131/75 (06-13-24 @ 10:54) (131/75 - 140/84)  RR: 16 (06-13-24 @ 10:54) (16 - 16)  SpO2: 99% (06-13-24 @ 10:54) (98% - 99%)    Physical Examination:  General - NAD, pleasant, cooperative   Neurologic Exam:  Mental status - Awake, Alert, Oriented to person, place, and time. Speech fluent.    Cranial nerves:  CN II: Pupils are 4 mm and briskly reactive to light.  CN III, IV, VI: EOMI, no nystagmus, no ptosis  CN V: Facial sensation is intact to pinprick in all 3 divisions bilaterally.  CN VII: Face is symmetric with normal eye closure and smile.  CN VII: Hearing is normal to rubbing fingers  CN IX, X: Palate elevates symmetrically. Phonation is normal.  CN XI: Head turning and shoulder shrug are intact  CN XII: Tongue is midline with normal movements and no atrophy.    Motor - Normal bulk and tone throughout. No pronator drift of out-stretched arms.  Strength testing            Deltoid(C5)  Biceps(C6)    Triceps(C7)       R            5                 5                        5                      L             5                 5                        5                                 Hip Flexion(L2/3)    Hip Extension (L4/5)   Knee Flexion (L4/5/S1)    Knee Extension (L3/4)   Dorsiflexion (L4/5)   Plantar Flexion (S1)  R              5                                    5                                     5                                                     5                                              5                          5  L              5                                     5                                     5                                                     5                                              5                          5    Sensation - Reduced sensation to pinprick and light touch on the right side of the body including the face and proximal and distal UE and LE.      DTR's -             Biceps      Triceps     Brachioradialis      Patellar    Ankle    Toes/plantar response  R             2+             2+                  2+                       2+            2+                 Down  L              2+             2+                 2+                        2+           2+                 Down      Gait and station - Posture is normal. Gait is steady with normal steps, base, arm swing, and turning. Heel and toe walking are normal. Tandem gait is normal       Labs:                        12.7   7.20  )-----------( 420      ( 13 Jun 2024 10:30 )             37.8     06-13    136  |  103  |  8   ----------------------------<  82  4.1   |  25  |  0.71    Ca    9.5      13 Jun 2024 10:30  Phos  2.8     06-13  Mg     2.20     06-13    TPro  7.4  /  Alb  4.5  /  TBili  0.4  /  DBili  x   /  AST  72<H>  /  ALT  24  /  AlkPhos  68  06-13    CAPILLARY BLOOD GLUCOSE        LIVER FUNCTIONS - ( 13 Jun 2024 10:30 )  Alb: 4.5 g/dL / Pro: 7.4 g/dL / ALK PHOS: 68 U/L / ALT: 24 U/L / AST: 72 U/L / GGT: x               CSF:                  Radiology:

## 2024-06-13 NOTE — ED CDU PROVIDER DISPOSITION NOTE - CLINICAL COURSE
23-year-old female with a history of migraines who presents for migraines and right-sided body numbness and tingling.  Patient endorses that 2 days ago she had her typical migraine presentation, but 1 day ago developed right-sided numbness and tingling which is new for her.  Spoke to her neurologist Dr. Murrell over the phone, who indicated that she should come to the ED.  Endorsed n/v. She denies any fevers, chills, dysuria. Patient received toradol, benadryl, solumedrol, reglan, with some improvement of her symptoms and due to current environment feels best if discharged as to not exacerbate her migraine, has follow up with her own neurologist tomorrow, spoke with our Neuro team, ok with outpatient follow up and MRI as outpatient. patient stable for dc home at this time.

## 2024-06-13 NOTE — ED CDU PROVIDER INITIAL DAY NOTE - DIFFERENTIAL DIAGNOSIS
Differential Diagnosis migraine (hemiplegic?), other migraine variant, demyelinating disease, musculoskeletal pain

## 2024-06-13 NOTE — ED CDU PROVIDER DISPOSITION NOTE - PATIENT PORTAL LINK FT
You can access the FollowMyHealth Patient Portal offered by [facility] by registering at the following website: [email or web address]. By joining [facility]’s FollowMyHealth portal, you will also be able to view your health information using other applications (apps) compatible with our system.

## 2024-06-13 NOTE — ED CDU PROVIDER INITIAL DAY NOTE - OBJECTIVE STATEMENT
Patient is a 23-year-old female with a history of migraines who presents for migraines and right-sided body numbness and tingling.  Patient endorses that 2 days ago she had her typical migraine presentation, but 1 day ago developed right-sided numbness and tingling which is new for her.  Spoke to her neurologist Dr. Murrell over the phone, who indicated that she should come to the ED.  Endorsed n/v. She denies any fevers, chills, dysuria.    Dr. Adair: I reviewed the above and agree, pt in ED with migraine consistent with her classic migraines, but now with right body (face/arm/leg) numbness and weakness).  Symptoms still present.  Pt evaluated by neurology and recommend CDU for MRI and further eval.

## 2024-06-13 NOTE — ED PROVIDER NOTE - OBJECTIVE STATEMENT
Patient is a 23-year-old female with a history of migraines who presents for migraines and right-sided body numbness and tingling.  Patient endorses that 2 days ago she had her typical migraine presentation, but 1 day ago developed right-sided numbness and tingling which is new for her.  Spoke to her neurologist Dr. Murrell over the phone, who indicated that she should come to the ED.  Endorsed n/v. She denies any fevers, chills, dysuria.

## 2024-06-13 NOTE — ED PROVIDER NOTE - PHYSICAL EXAMINATION
Physical Exam:  Gen: NAD, non-toxic appearing  Head: NCAT  HEENT: Normal conjunctiva, trachea midline, moist mucous membranes  Lung: CTAB, no respiratory distress, no wheezes/rhonchi/rales B/L, speaking in full sentences  CV: RRR, no murmurs, rubs or gallops  Abd: Soft, NT, ND, no guarding, rigidity, rebound tenderness, or CVA tenderness   MSK: No visible deformities, moves all four extremities   Neuro: No focal motor deficits, sensation decreased on right side of body, CN II-CNXII intact  Skin: Warm, well perfused, no visible rashes, no leg swelling  Psych: appropriate affect and mood

## 2024-06-13 NOTE — CONSULT NOTE ADULT - ASSESSMENT
ASSESSMENT       IMPRESSION       RECOMMENDATION         Patient to be seen by team and attending. Note finalized upon attending attestation.  ASSESSMENT   JENNIFER FERREIRA is a 23y (2000) woman with a PMHx significant for migraines presenting with 4 months of 7/10 headache with associated right sided numbness. Reassuringly SB has normal strength testing and no acute findings on CT head. Given her history it is likely her sensory deficits are due to her migraine headache. Her age, lack of risk factors and chronic time course make an ischemic process less likely. However, we would like to rule out a venous thrombosis out of an abundance of caution.     IMPRESSION   Patient is stable neurologically.     RECOMMENDATION   [] MRI, MRA and MRV   [] admit to CDU for monitoring  [] 4mg dexamethasone IV.         Patient to be seen by team and attending. Note finalized upon attending attestation.  ASSESSMENT   JENNIFER FERREIRA is a 23y (2000) woman with a PMHx significant for migraines presenting with 4 months of 7/10 headache with associated right sided numbness. Reassuringly SB has normal strength testing and no acute findings on CT head. Given her history it is likely her sensory deficits are due to her migraine headache. Her age, lack of risk factors and chronic time course make an ischemic process less likely. However, we would like to rule out a venous thrombosis out of an abundance of caution.     IMPRESSION   Patient is stable neurologically.     RECOMMENDATION   [] CDU   [] MRI head non con, MR venogram   [] recommend migraine medications: toradol 30mg IV q8h PRN, reglan 10mg q8h PRN, benadryl 25mg IV q8h PRN, IV hydration, Mg 2g IV x1, please give all at once   [] Methylprednisolone sodium succinate injectable 125mg x1  [] Encourage avoidance of common migraine triggers (artificial sweeteners, food preservative (MSG), aged cheese, skipping meals, change in wake/sleep cycle and intense physical exertion)  [] Encourage lifestyle changes that help migraine: physical exercise, fixed meal time, fixed sleep/wake cycle, avoid smoking and highly caffeinated products         Patient to be seen by team and attending. Note finalized upon attending attestation.  ASSESSMENT   JENNIFER FERREIRA is a 23y (2000) woman with a PMHx significant for migraines presenting with 4 months of 7/10 headache with associated right sided numbness. Reassuringly SB has normal strength testing and no acute findings on CT head. Given her history it is likely her sensory deficits are due to her migraine headache. Her age, lack of risk factors and chronic time course make an ischemic process less likely. However, we would like to rule out a venous thrombosis out of an abundance of caution.     IMPRESSION   Photophobia and phonophobia with aura, likely intractable migraine     RECOMMENDATION   [] CDU   [] MRI head non con, MR venogram   [] recommend migraine medications: toradol 30mg IV q8h PRN, reglan 10mg q8h PRN, benadryl 25mg IV q8h PRN, IV hydration, Mg 2g IV x1, please give all at once   [] Methylprednisolone sodium succinate injectable 125mg x1  [] Encourage avoidance of common migraine triggers (artificial sweeteners, food preservative (MSG), aged cheese, skipping meals, change in wake/sleep cycle and intense physical exertion)  [] Encourage lifestyle changes that help migraine: physical exercise, fixed meal time, fixed sleep/wake cycle, avoid smoking and highly caffeinated products       Patient to be seen by team and attending. Note finalized upon attending attestation.

## 2024-06-13 NOTE — ED CDU PROVIDER DISPOSITION NOTE - NSFOLLOWUPINSTRUCTIONS_ED_ALL_ED_FT
Follow up with your Primary Medical Doctor within 2-3days. If results or reports were given to you, show copies of your reports given to you. Take all of your medications as previously prescribed.    Follow with your Neurologist tomorrow.  Continue all current medications.    --avoidance of common migraine triggers (artificial sweeteners, food preservative (MSG), aged cheese, skipping meals, change in wake/sleep cycle and intense physical exertion)    --lifestyle changes that help migraine: physical exercise, fixed meal time, fixed sleep/wake cycle, avoid smoking and highly caffeinated products     Acute Headache    WHAT YOU NEED TO KNOW:    An acute headache is pain or discomfort that starts suddenly and gets worse quickly. You may have an acute headache only when you feel stress or eat certain foods. Other acute headache pain can happen every day, and sometimes several times a day.    DISCHARGE INSTRUCTIONS:    Return to the emergency department if:    You have severe pain.    You have numbness or weakness on one side of your face or body.    You have a headache that occurs after a blow to the head, a fall, or other trauma.    You have a headache, are forgetful or confused, or have trouble speaking.    You have a headache, stiff neck, and a fever.  Contact your healthcare provider if:    You have a constant headache and are vomiting.    You have a headache each day that does not get better, even after treatment.    You have changes in your headaches, or new symptoms that occur when you have a headache.    You have questions or concerns about your condition or care.  Medicines: You may need any of the following:    Prescription pain medicine may be given. The medicine your healthcare provider recommends will depend on the kind of headaches you have. You will need to take prescription headache medicines as directed to prevent a problem called rebound headache. These headaches happen with regular use of pain relievers for headache disorders.    NSAIDs, such as ibuprofen, help decrease swelling, pain, and fever. This medicine is available with or without a doctor's order. NSAIDs can cause stomach bleeding or kidney problems in certain people. If you take blood thinner medicine, always ask your healthcare provider if NSAIDs are safe for you. Always read the medicine label and follow directions.    Acetaminophen decreases pain and fever. It is available without a doctor's order. Ask how much to take and how often to take it. Follow directions. Read the labels of all other medicines you are using to see if they also contain acetaminophen, or ask your doctor or pharmacist. Acetaminophen can cause liver damage if not taken correctly. Do not use more than 3 grams (3,000 milligrams) total of acetaminophen in one day.    Antidepressants may be given for some kinds of headaches.    Take your medicine as directed. Contact your healthcare provider if you think your medicine is not helping or if you have side effects. Tell him or her if you are allergic to any medicine. Keep a list of the medicines, vitamins, and herbs you take. Include the amounts, and when and why you take them. Bring the list or the pill bottles to follow-up visits. Carry your medicine list with you in case of an emergency.  Manage your symptoms:    Apply heat or ice on the headache area. Use a heat or ice pack. For an ice pack, you can also put crushed ice in a plastic bag. Cover the pack or bag with a towel before you apply it to your skin. Ice and heat both help decrease pain, and heat also helps decrease muscle spasms. Apply heat for 20 to 30 minutes every 2 hours. Apply ice for 15 to 20 minutes every hour. Apply heat or ice for as long and for as many days as directed. You may alternate heat and ice.    Relax your muscles. Lie down in a comfortable position and close your eyes. Relax your muscles slowly. Start at your toes and work your way up your body.    Keep a record of your headaches. Write down when your headaches start and stop. Include your symptoms and what you were doing when the headache began. Record what you ate or drank for 24 hours before the headache started. Describe the pain and where it hurts. Keep track of what you did to treat your headache and if it worked.  Prevent an acute headache:    Avoid anything that triggers an acute headache. Examples include exposure to chemicals, going to high altitude, or not getting enough sleep. Create a regular sleep routine. Go to sleep at the same time and wake up at the same time each day. Do not use electronic devices before bedtime. These may trigger a headache or prevent you from sleeping well.    Do not smoke. Nicotine and other chemicals in cigarettes and cigars can trigger an acute headache or make it worse. Ask your healthcare provider for information if you currently smoke and need help to quit. E-cigarettes or smokeless tobacco still contain nicotine. Talk to your healthcare provider before you use these products.    Limit alcohol as directed. Alcohol can trigger an acute headache or make it worse. If you have cluster headaches, do not drink alcohol during an episode. For other types of headaches, ask your healthcare provider if it is safe for you to drink alcohol. Ask how much is safe for you to drink, and how often.    Exercise as directed. Exercise can reduce tension and help with headache pain. Aim for 30 minutes of physical activity on most days of the week. Your healthcare provider can help you create an exercise plan.    Eat a variety of healthy foods. Healthy foods include fruits, vegetables, low-fat dairy products, lean meats, fish, whole grains, and cooked beans. Your healthcare provider or dietitian can help you create meals plans if you need to avoid foods that trigger headaches.  Follow up with your healthcare provider as directed: Bring your headache record with you when you see your healthcare provider. Write down your questions so you remember to ask them during your visits.

## 2024-06-13 NOTE — ED CDU PROVIDER INITIAL DAY NOTE - PHYSICAL EXAMINATION
Physical Exam:  Gen: NAD, non-toxic appearing  Head: NCAT  HEENT: Normal conjunctiva, trachea midline, moist mucous membranes  Lung: CTAB, no respiratory distress, no wheezes/rhonchi/rales B/L, speaking in full sentences  CV: RRR, no murmurs, rubs or gallops  Abd: Soft, NT, ND, no guarding, rigidity, rebound tenderness, or CVA tenderness   MSK: No visible deformities, moves all four extremities   Neuro: No focal motor deficits, sensation decreased on right side of body, CN II-CNXII intact  Skin: Warm, well perfused, no visible rashes, no leg swelling  Psych: appropriate affect and mood Physical Exam:  Gen: NAD, non-toxic appearing  Head: NCAT  HEENT: Normal conjunctiva, trachea midline, moist mucous membranes  Lung: CTAB, no respiratory distress, no wheezes/rhonchi/rales B/L, speaking in full sentences  CV: RRR, no murmurs, rubs or gallops  Abd: Soft, NT, ND, no guarding, rigidity, rebound tenderness, or CVA tenderness   MSK: No visible deformities, moves all four extremities   Neuro: see below  Skin: Warm, well perfused, no visible rashes, no leg swelling  Psych: appropriate affect and mood    On exam pt overall well appearing, in NAD, heart RRR, lungs CTAB, abd NTND, extremities without swelling, strength 5/5 in left UE/LE and 4+/5 in right UE/LE and skin without rash.  Sensation decreased in right face/arm/leg.  Gait intact and normal.  EOMI/PERRLA.  Speech normal.

## 2024-06-13 NOTE — ED ADULT NURSE REASSESSMENT NOTE - NS ED NURSE REASSESS COMMENT FT1
Pt endorses mild relief of headache, denies changes in vision, no weakness noted to all 4 extremities, pt denies any present numbness/tingling to any 4 extremities, breathing even and unlabored, denies chest pain, afebrile, will continue to monitor.

## 2024-06-13 NOTE — ED PROVIDER NOTE - PROGRESS NOTE DETAILS
Spoke to Dr. 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  But would like a CT Noncon and a CTA head and neck. He indicated no need for MRI. Plan for imaging.   Latoya Grant MD PGY-1 On reevaluation, patient endorses that her headaches gotten worse.  Imaging was benign.  Spoke to Dr. Ferrari for the update, indicated that he could give 2 g of magnesium, if he has persistent headache recommended in-house neurology consult.  Latoya Grant MD PGY-1 Dr. Adair: spoke to [facility] neurology, will evaluate pt Spoke to neurology, indicated patient is okay for CDU for MRI/MRV.  Plan for repeat headache cocktail in 1 hour.  Plan for Solu-Medrol 125 mg one-time push at 5PM.  CDU accepted patient.  Latoya Grant MD PGY-1

## 2024-06-13 NOTE — CONSULT NOTE ADULT - ATTENDING COMMENTS
Patient left the hospital before the case was discussed with me. I did not see this patient. - Shayy Potter MD

## 2024-06-15 ENCOUNTER — EMERGENCY (EMERGENCY)
Facility: HOSPITAL | Age: 24
LOS: 1 days | Discharge: HOME | End: 2024-06-15
Attending: STUDENT IN AN ORGANIZED HEALTH CARE EDUCATION/TRAINING PROGRAM | Admitting: EMERGENCY MEDICINE
Payer: COMMERCIAL

## 2024-06-15 VITALS
OXYGEN SATURATION: 98 % | RESPIRATION RATE: 18 BRPM | WEIGHT: 104.94 LBS | TEMPERATURE: 98 F | HEART RATE: 112 BPM | DIASTOLIC BLOOD PRESSURE: 78 MMHG | SYSTOLIC BLOOD PRESSURE: 140 MMHG

## 2024-06-15 PROCEDURE — 99223 1ST HOSP IP/OBS HIGH 75: CPT

## 2024-06-15 PROCEDURE — 99233 SBSQ HOSP IP/OBS HIGH 50: CPT

## 2024-06-15 RX ORDER — KETOROLAC TROMETHAMINE 30 MG/ML
15 SYRINGE (ML) INJECTION ONCE
Refills: 0 | Status: DISCONTINUED | OUTPATIENT
Start: 2024-06-15 | End: 2024-06-15

## 2024-06-15 RX ORDER — ACETAMINOPHEN 500 MG
725 TABLET ORAL ONCE
Refills: 0 | Status: COMPLETED | OUTPATIENT
Start: 2024-06-15 | End: 2024-06-15

## 2024-06-15 RX ORDER — SODIUM CHLORIDE 9 MG/ML
1000 INJECTION INTRAMUSCULAR; INTRAVENOUS; SUBCUTANEOUS
Refills: 0 | Status: DISCONTINUED | OUTPATIENT
Start: 2024-06-15 | End: 2024-06-19

## 2024-06-15 RX ORDER — DIAZEPAM 5 MG
2 TABLET ORAL ONCE
Refills: 0 | Status: DISCONTINUED | OUTPATIENT
Start: 2024-06-15 | End: 2024-06-16

## 2024-06-15 RX ORDER — KETAMINE HYDROCHLORIDE 100 MG/ML
5 INJECTION INTRAMUSCULAR; INTRAVENOUS ONCE
Refills: 0 | Status: DISCONTINUED | OUTPATIENT
Start: 2024-06-15 | End: 2024-06-15

## 2024-06-15 RX ORDER — SODIUM CHLORIDE 9 MG/ML
1000 INJECTION INTRAMUSCULAR; INTRAVENOUS; SUBCUTANEOUS ONCE
Refills: 0 | Status: COMPLETED | OUTPATIENT
Start: 2024-06-15 | End: 2024-06-15

## 2024-06-15 RX ORDER — MAGNESIUM SULFATE 500 MG/ML
1 VIAL (ML) INJECTION ONCE
Refills: 0 | Status: DISCONTINUED | OUTPATIENT
Start: 2024-06-15 | End: 2024-06-15

## 2024-06-15 RX ORDER — KETOROLAC TROMETHAMINE 30 MG/ML
15 SYRINGE (ML) INJECTION ONCE
Refills: 0 | Status: DISCONTINUED | OUTPATIENT
Start: 2024-06-15 | End: 2024-06-16

## 2024-06-15 RX ORDER — MAGNESIUM SULFATE 500 MG/ML
2 VIAL (ML) INJECTION ONCE
Refills: 0 | Status: COMPLETED | OUTPATIENT
Start: 2024-06-15 | End: 2024-06-16

## 2024-06-15 RX ADMIN — Medication 15 MILLIGRAM(S): at 22:47

## 2024-06-15 RX ADMIN — SODIUM CHLORIDE 1000 MILLILITER(S): 9 INJECTION INTRAMUSCULAR; INTRAVENOUS; SUBCUTANEOUS at 22:48

## 2024-06-15 RX ADMIN — KETAMINE HYDROCHLORIDE 5 MILLIGRAM(S): 100 INJECTION INTRAMUSCULAR; INTRAVENOUS at 22:47

## 2024-06-15 RX ADMIN — Medication 290 MILLIGRAM(S): at 22:48

## 2024-06-15 NOTE — ED CDU PROVIDER INITIAL DAY NOTE - OBJECTIVE STATEMENT
Marcia WHITAKER: 23-year-old female history of migraine headaches recently seen in the ED for migraines was sent to the CDU for neuro evaluation after a CTA of her head that was nonactionable at that time was pending MRV but declined and went home Maite presents today with persisting right-sided migraine headache, she reports since March she has had near daily migraine headaches had a history of migraine headaches in the setting of her being child when she was younger associated with nausea photophobia intermittent lightheadedness and dizziness and intermittent blurry vision to the right eye when her symptoms are maximal.  Patient has tried a myriad of migraine cocktails without effect, she has also been following with neurology but none of the medicines that she has been prescribed for migraines are improving her symptoms she tried Fioricet and Motrin today without improvement.  She can move everything and feel everything no fever no rash no recent falls or head trauma.    CDU ELSA CASTELLANOS: 23yoF PMH of migraines, recent CDU stay and left prior to MRI, representing with intractable migraine. endorses R sided headache, throbbing, a/w "haziness" to her R eye. present upon awakening. tried fioricet and ibuprofen with no relief. Follows with neuro Dr. Murrell outpatient, has tried triptans, nyrtec, ubrelvy with no relief. no fevers, recent illness, neck stiffness. does endorse photosensitivity. no OCP use, no recent travel. no recent head trauma.   PLAN: recv'd ketamine and toradol/tylenol in ED with minimal relief. plan for CDU for migraine cocktails, MRI brain/venogram and frequent reassessments. case d/w neuro.

## 2024-06-15 NOTE — ED PROVIDER NOTE - OBJECTIVE STATEMENT
Marcia WHITAKER: 23-year-old female history of migraine headaches recently seen in the ED for migraines was sent to the CDU for neuro evaluation after a CTA of her head that was nonactionable at that time was pending MRV but declined and went home Maite presents today with persisting right-sided migraine headache, she reports since March she has had near daily migraine headaches had a history of migraine headaches in the setting of her being child when she was younger associated with nausea photophobia intermittent lightheadedness and dizziness and intermittent blurry vision to the right eye when her symptoms are maximal.  Patient has tried a myriad of migraine cocktails without effect, she has also been following with neurology but none of the medicines that she has been prescribed for migraines are improving her symptoms she tried Fioricet and Motrin today without improvement.  She can move everything and feel everything no fever no rash no recent falls or head trauma.

## 2024-06-15 NOTE — ED ADULT TRIAGE NOTE - CHIEF COMPLAINT QUOTE
Patient c/o right-sided HA, blurry vision and dizziness x 4 days. Seen in ED 2 days ago and discharged with negative workup. Hx. migraines.

## 2024-06-15 NOTE — ED PROVIDER NOTE - CROS ED CONS ALL NEG
Reviewed inpatient cardiac rehabilitation education with the patient. Phase 2 Cardiac Rehab referral will be made to [facility] [phone].  A home exercise prescription, activity restrictions & precautions, and appropriate risk factor education have been completed. All education is documented in the Cardiac Rehabilitation Education Record. Total time spent educating the patient was 30 minutes.       negative...

## 2024-06-15 NOTE — ED PROVIDER NOTE - CLINICAL SUMMARY MEDICAL DECISION MAKING FREE TEXT BOX
Marcia WHITAKER:   Exam vitals are stable nontoxic-appearing noted mild tachycardia otherwise physical exam as above, DDx concern for likely recurrence of migraine headaches, labs and imaging reviewed from recent visit, notes reviewed with neurology recommendations, given overall presentation we will try migraine cocktail again this time with addition of dissociative and analgesic dose of ketamine, will rediscuss with neuro, patient is agreeable for plan for CDU, presentation does not appear consistent with that of subarachnoid hemorrhage or acute infectious etiology, given recent imaging and chronicity of symptoms, pseudotumor is a possibility will discuss with neuro give additional meds as needed and reassess placed in CDU neuro paged.

## 2024-06-15 NOTE — ED CDU PROVIDER INITIAL DAY NOTE - PHYSICAL EXAMINATION
CONSTITUTIONAL:nontoxic appearing; in moderate distress  HEAD: Normocephalic, atraumatic;  EYES: PERRL, EOM intact, conjunctiva and sclera WNL;  NECK/LYMPH: Supple; non-tender;  CARD: Normal S1, S2; no murmurs, rubs, or gallops noted  RESP: Normal chest excursion with respiration; breath sounds clear and equal bilaterally; no wheezes, rhonchi, or rales noted  EXT/MS: moves all extremities;  SKIN: Normal for age and race; warm; dry; good turgor; no apparent lesions or exudate noted  NEURO: Awake, alert, oriented x 3, no gross deficits, CN II-XII grossly intact, no motor or sensory deficit noted  PSYCH: Normal mood; appropriate affect

## 2024-06-15 NOTE — ED CDU PROVIDER INITIAL DAY NOTE - CLINICAL SUMMARY MEDICAL DECISION MAKING FREE TEXT BOX
23yoF PMH of migraines, recent CDU stay and left prior to MRI, representing with intractable migraine. endorses R sided headache, throbbing, a/w "haziness" to her R eye. present upon awakening. tried fioricet and ibuprofen with no relief. Follows with neuro Dr. Murrell outpatient, has tried triptans, nyrtec, ubrelvy with no relief. no fevers, recent illness, neck stiffness. does endorse photosensitivity. no OCP use, no recent travel. no recent head trauma.     recv'd ketamine and toradol/tylenol in ED with minimal relief.   plan for CDU for migraine cocktails, MRI brain/venogram and frequent reassessments. case d/w neuro.

## 2024-06-15 NOTE — ED ADULT NURSE NOTE - OBJECTIVE STATEMENT
23 year old AO4 ambulatory female c/o 10/10 migraine headache. Denies PMHx. Had a recent admission for similar symptoms. Pt states the headache has gotten worse and at home pain medication is of no relief. Pt endorse light sensitivities. Right 20g IV placed, medicated as per eMAR. Pending admission to observation unit for further monitoring. Placed on the cardiac monitor, noted to be sinus tachycardia.

## 2024-06-15 NOTE — ED ADULT NURSE NOTE - NSFALLUNIVINTERV_ED_ALL_ED
Bed/Stretcher in lowest position, wheels locked, appropriate side rails in place/Call bell, personal items and telephone in reach/Instruct patient to call for assistance before getting out of bed/chair/stretcher/Non-slip footwear applied when patient is off stretcher/[place] to call system/Physically safe environment - no spills, clutter or unnecessary equipment/Purposeful proactive rounding/Room/bathroom lighting operational, light cord in reach

## 2024-06-15 NOTE — ED CDU PROVIDER INITIAL DAY NOTE - ATTENDING APP SHARED VISIT CONTRIBUTION OF CARE
I have personally performed a face to face medical and diagnostic evaluation of the patient. I have discussed with and reviewed the JAY's note and agree with the History, ROS, Physical Exam and MDM unless otherwise indicated. A brief summary of my personal evaluation and impression can be found below. I actively participated in the comanagement of this patient with the JAY. I have personally reviewed all orders, study/imaging results, medication orders. I discussed indications for consultant evaluation and consultant recommendations with the JAY when applicable, and have discussed the disposition plan with the JAY.    Please see initial ED provider note for further details.

## 2024-06-15 NOTE — ED PROVIDER NOTE - PHYSICAL EXAMINATION
Marcia WHITAKER:  VITALS: Initial triage and subsequent vitals have been reviewed by me.  GEN APPEARANCE: Alert, cooperative. Non-toxic appearing. Well appearing. NAD.  HEAD: Atraumatic, normocephalic   EYES: PERRLa, EOMI, vision grossly intact.   EARS: Gross hearing intact.   NOSE: No nasal discharge, no external evidence of epistaxis.   NECK: Supple  CV: RRR, S1S2, no c/r/m/g. No cyanosis. Extremities warm, well perfused. Cap refill <2 seconds. No bruits.   LUNGS: CTAB. No wheezing. No rales. No rhonchi. No diminished breath sounds.   ABDOMEN: Soft, NTND. No guarding or rebound. No masses.   MSK/EXT: Spine appears normal, no spine point tenderness. No CVA ttp. Normal muscular development. Pelvis stable. No obvious joint or bony deformity, no peripheral edema.   NEURO: Alert, follows commands. Weight bearing normal. Speech normal. Sensation and motor normal x4 extremities. CN2-12 normal, coordination normal, ambulating normally. UE & LE 5/5 b/l.  SKIN: Normal color for race, warm, dry and intact. No evidence of rash.  PSYCH: Normal mood and affect.

## 2024-06-16 PROBLEM — G43.909 MIGRAINE, UNSPECIFIED, NOT INTRACTABLE, WITHOUT STATUS MIGRAINOSUS: Chronic | Status: ACTIVE | Noted: 2024-06-13

## 2024-06-16 LAB — HCG SERPL-ACNC: <1 MIU/ML — SIGNIFICANT CHANGE UP

## 2024-06-16 PROCEDURE — 70545 MR ANGIOGRAPHY HEAD W/DYE: CPT | Mod: 26,MC,59

## 2024-06-16 PROCEDURE — 70553 MRI BRAIN STEM W/O & W/DYE: CPT | Mod: 26,MC

## 2024-06-16 PROCEDURE — 72156 MRI NECK SPINE W/O & W/DYE: CPT | Mod: 26,MC

## 2024-06-16 PROCEDURE — 72157 MRI CHEST SPINE W/O & W/DYE: CPT | Mod: 26,MC

## 2024-06-16 PROCEDURE — 99233 SBSQ HOSP IP/OBS HIGH 50: CPT

## 2024-06-16 PROCEDURE — 99284 EMERGENCY DEPT VISIT MOD MDM: CPT

## 2024-06-16 PROCEDURE — 72158 MRI LUMBAR SPINE W/O & W/DYE: CPT | Mod: 26,MC

## 2024-06-16 RX ORDER — SUMATRIPTAN SUCCINATE 4 MG/.5ML
6 INJECTION, SOLUTION SUBCUTANEOUS ONCE
Refills: 0 | Status: COMPLETED | OUTPATIENT
Start: 2024-06-16 | End: 2024-06-16

## 2024-06-16 RX ORDER — ACETAMINOPHEN 500 MG
725 TABLET ORAL ONCE
Refills: 0 | Status: COMPLETED | OUTPATIENT
Start: 2024-06-16 | End: 2024-06-16

## 2024-06-16 RX ORDER — DIPHENHYDRAMINE HCL 50 MG
25 CAPSULE ORAL ONCE
Refills: 0 | Status: DISCONTINUED | OUTPATIENT
Start: 2024-06-16 | End: 2024-06-16

## 2024-06-16 RX ORDER — SUMATRIPTAN SUCCINATE 4 MG/.5ML
6 INJECTION, SOLUTION SUBCUTANEOUS ONCE
Refills: 0 | Status: COMPLETED | OUTPATIENT
Start: 2024-06-16 | End: 2024-06-17

## 2024-06-16 RX ORDER — METOCLOPRAMIDE HCL 10 MG
10 TABLET ORAL ONCE
Refills: 0 | Status: COMPLETED | OUTPATIENT
Start: 2024-06-16 | End: 2024-06-16

## 2024-06-16 RX ADMIN — SUMATRIPTAN SUCCINATE 6 MILLIGRAM(S): 4 INJECTION, SOLUTION SUBCUTANEOUS at 12:44

## 2024-06-16 RX ADMIN — Medication 125 MILLIGRAM(S): at 09:56

## 2024-06-16 RX ADMIN — Medication 2 MILLIGRAM(S): at 00:09

## 2024-06-16 RX ADMIN — Medication 15 MILLIGRAM(S): at 00:09

## 2024-06-16 RX ADMIN — Medication 1 MILLIGRAM(S): at 22:03

## 2024-06-16 RX ADMIN — SODIUM CHLORIDE 100 MILLILITER(S): 9 INJECTION INTRAMUSCULAR; INTRAVENOUS; SUBCUTANEOUS at 00:08

## 2024-06-16 RX ADMIN — SODIUM CHLORIDE 100 MILLILITER(S): 9 INJECTION INTRAMUSCULAR; INTRAVENOUS; SUBCUTANEOUS at 12:43

## 2024-06-16 RX ADMIN — Medication 25 GRAM(S): at 00:07

## 2024-06-16 RX ADMIN — Medication 290 MILLIGRAM(S): at 06:21

## 2024-06-16 RX ADMIN — Medication 15 MILLIGRAM(S): at 01:10

## 2024-06-16 RX ADMIN — Medication 1 MILLIGRAM(S): at 13:25

## 2024-06-16 RX ADMIN — Medication 10 MILLIGRAM(S): at 06:23

## 2024-06-16 NOTE — CONSULT NOTE ADULT - ATTENDING COMMENTS
Headache with fluctuating intensity for the past 4 months.  Worse with standing up.  Better lying down.  Prior to this had migraine type headaches only  a few times per year.  She is unable to perform her usual activities including going to school and cancelling planned vacations.     A/P  Ms. Vallejo is a 22 yo woman with persistent severe headache for 4 months - may be positional - worse with standing up.    H/O migraine but only a few headaches per year - never intractable.   Need to exclude secondary headache including spontaneous intracranial hypotension.    After beta-HCG is done and negative we recommend:  - Treatment as above  - MRI brain, C,T,LS spine w/wo gado, MRVenogram    Outpatient f/u - headache specialist - Dr. Fredeirck - [phone]  D/W primary team, father and patient.   Thank you Headache with fluctuating intensity for the past 4 months.  Worse with standing up.  Better lying down.  Prior to this had migraine type headaches only  a few times per year.  She is unable to perform her usual activities including going to school and cancelling planned vacations.   Fundus exam limited due to photophobia.    A/P  Ms. Vallejo is a 24 yo woman with persistent severe headache for 4 months - may be positional - worse with standing up.    H/O migraine but only a few headaches per year - never intractable.   Need to exclude secondary headache including spontaneous intracranial hypotension.    After beta-HCG is done and negative we recommend:  - Treatment as above  - MRI brain, C,T,LS spine w/wo gado, MRVenogram    Outpatient f/u - headache specialist - Dr. Frederick - [phone]  D/W primary team, father and patient.   Thank you

## 2024-06-16 NOTE — ED CDU PROVIDER SUBSEQUENT DAY NOTE - CLINICAL SUMMARY MEDICAL DECISION MAKING FREE TEXT BOX
23yoF PMH of migraines, recent CDU stay and left prior to MRI, representing with intractable migraine.   Pt recv'd ketamine and toradol/tylenol in ED with minimal relief. plan for CDU for migraine cocktails, MRI brain/venogram and frequent reassessments. case d/w neuro.  in interim, pt evaluated by neuro, agree with plan, no further recs. pt given additional analgesics

## 2024-06-16 NOTE — CONSULT NOTE ADULT - SUBJECTIVE AND OBJECTIVE BOX
Neurology - Consult Note    -  Spectra: [phone] ([facility]), [phone] ([facility])  -    HPI: Patient JENNIFER FERREIRA is a 23y (2000) woman with a PMHx significant for history of migraine recently dx (Follows with Dr. Murrell) in Jan 2024 who recently presented on 6/14 to [facility] for HA and left after migraine cocktail and somewhat improved HA without waiting for MRI brain. Pt today presented with persistent migraine headache, she describes as R sided, unilateral and pounding with a pressure sensation. Patient reports headache is 10/10 but she is very comfortable appearing and in no acute distress. Neurological examination unremarkable except decreased to light touch on R V1/V2/V3 compared to L. When she came on 6/14 she states that her headache was also associated with right-sided body numbness and tingling which has since resolved.  Patient endorses that 2 days ago she had her typical migraine presentation, but 1 day ago developed right-sided numbness and tingling which is new for her.  Spoke to her neurologist Dr. Murrell over the phone, who indicated that she should come to the ED.  Endorsed n/v. She denies any fevers, chills, dysuria. On my interview, however, she does report 1 week ago she was dx with Coxsackie virus at urgent care after found to have sore throat but this resolved. Pt denies any heavy lifting, sinusitis, OCP usage. 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        Review of Systems:  INCOMPLETE   CONSTITUTIONAL: No fevers or chills  EYES AND ENT: No visual changes or no throat pain   NECK: No pain or stiffness  RESPIRATORY: No hemoptysis or shortness of breath  CARDIOVASCULAR: No chest pain or palpitations  GASTROINTESTINAL: No melena or hematochezia  GENITOURINARY: No dysuria or hematuria  NEUROLOGICAL: +As stated in HPI above  SKIN: No itching, burning, rashes, or lesions   All other review of systems is negative unless indicated above.    Allergies:  Augmentin (Unknown)      PMHx/PSHx/Family Hx: As above, otherwise see below   Migraine        Social Hx:  No current use of tobacco, alcohol, or illicit drugs  Lives with ***    Medications:  MEDICATIONS  (STANDING):  sodium chloride 0.9%. 1000 milliLiter(s) (100 mL/Hr) IV Continuous <Continuous>    MEDICATIONS  (PRN):      Vitals:  T(C): 36.5 (06-15-24 @ 23:31), Max: 36.7 (06-15-24 @ 22:56)  HR: 96 (06-15-24 @ 23:31) (96 - 118)  BP: 123/86 (06-15-24 @ 23:31) (121/88 - 140/78)  RR: 18 (06-15-24 @ 23:31) (18 - 18)  SpO2: 99% (06-15-24 @ 23:31) (98% - 100%)    Physical Examination: INCOMPLETE  General - NAD  Cardiovascular - Peripheral pulses palpable, no edema  Eyes - Fundoscopy with flat, sharp optic discs and no hemorrhage or exudates; Fundoscopy not well visualized; Fundoscopy not performed due to safety precautions in the setting of the COVID-19 pandemic    Neurologic Exam:  Mental status - Awake, Alert, Oriented to person, place, and time. Speech fluent, repetition and naming intact. Follows simple and complex commands. Attention/concentration, recent and remote memory (including registration and recall), and fund of knowledge intact    Cranial nerves - PERRLA, VFF, EOMI, face sensation (V1-V3) intact b/l, facial strength intact without asymmetry b/l, hearing intact b/l, palate with symmetric elevation, trapezius OR sternocleidomastiod 5/5 strength b/l, tongue midline on protrusion with full lateral movement    Motor - Normal bulk and tone throughout. No pronator drift.  Strength testing            Deltoid      Biceps      Triceps     Wrist Extension    Wrist Flexion     Interossei         R            5                 5               5                     5                              5                        5                 5  L             5                 5               5                     5                              5                        5                 5              Hip Flexion    Hip Extension    Knee Flexion    Knee Extension    Dorsiflexion    Plantar Flexion  R              5                           5                       5                           5                            5                          5  L              5                           5                        5                           5                            5                          5    Sensation - Light touch/temperature OR pain/vibration intact throughout    DTR's -             Biceps      Triceps     Brachioradialis      Patellar    Ankle    Toes/plantar response  R             2+             2+                  2+                       2+            2+                 Down  L              2+             2+                 2+                        2+           2+                 Down    Coordination - Finger to Nose intact b/l. No tremors appreciated    Gait and station - Normal casual gait. Romberg (-)    Labs:          CAPILLARY BLOOD GLUCOSE              CSF:                  Radiology:     Neurology - Consult Note    -  Spectra: [phone] ([facility]), [phone] ([facility])  -    HPI: Patient JENNIFER FERREIRA is a 23y (2000) woman with a PMHx significant for history of migraine (Follows with Dr. Murrell) who recently presented on 6/13/2024 to [facility] for HA and left after migraine cocktail and somewhat improved HA without waiting for MRI brain. Pt today presented with persistent migraine headache, she describes as R sided, unilateral and pounding with a pressure sensation. Patient reports headache is 10/10 but she is very comfortable appearing and in no acute distress. Neurological examination unremarkable except decreased to light touch on R V1/V2/V3 compared to L. When she came on 6/13 she states that her headache was also associated with right-sided body numbness and tingling which has since resolved.  Patient endorses that she had her typical migraine presentation, but right-sided numbness and tingling is new for her. Endorsed n/v. She denies any fevers, chills, dysuria. On my interview, however, she does report 1 week ago she was dx with Coxsackie virus at urgent care after found to have sore throat but this resolved. Pt denies any heavy lifting, sinusitis, OCP usage. 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    See further migraine hx as below:  At the age of [age] patient started getting one mild headache a year. However, in february the patient woke up with a 7/10 headache that has not gone away since. The headache is isolated to the right side and is typically behind her eye but sometimes extends to the full right side of her head. The patient notes that the headache is consistent was she goes to bed with it at night and wakes up with it in the morning. She has not noticed any facial droop or changes in facial expression. She also denies weakness. She notes that sometimes she sees black spots in her right eye. She also notes sensitivity to both light and sound. In the past four months has tried multiple medications with no relief. In February she tried risatriptan, tritriptan and sumitriptan. In March/April she switched to Ubrevly for two weeks with no relief. She then switched to Nurtec for two months again with no relief. Two weeks ago she received Botox for the first time which reduced her headache severity from a 7/10 to 5/10. Upon arrival to the ED, the patient received the headache cocktail of tylenol, ketoralac, and reglan. She says there was mild relief through using magnesium.     On my exam patient is sleeping, easily aroused, well-appearing, AAOx4 and cooperative.   States she took tylenol and fiorcet with no relief. Then at the ED she was given Ketamine, without any significant decrease in HA severity. Denies any vertigo, weakness, blurry vision at this time. Has some pain behind her eye which is classic for her typical HAs.    She reports that she is amenable to staying for MRI at this time. CT/CTA unremarkable.    Review of Systems:  CONSTITUTIONAL: No fevers or chills  NECK: No pain or stiffness  NEUROLOGICAL: +As stated in HPI above  SKIN: No itching, burning, rashes, or lesions   All other review of systems is negative unless indicated above.    Allergies:  Augmentin (Unknown)      PMHx/PSHx/Family Hx: As above, otherwise see below   Migraine        Social Hx:  No current use of tobacco, alcohol, or illicit drugs    Medications:  MEDICATIONS  (STANDING):  sodium chloride 0.9%. 1000 milliLiter(s) (100 mL/Hr) IV Continuous <Continuous>    MEDICATIONS  (PRN):      Vitals:  T(C): 36.5 (06-15-24 @ 23:31), Max: 36.7 (06-15-24 @ 22:56)  HR: 96 (06-15-24 @ 23:31) (96 - 118)  BP: 123/86 (06-15-24 @ 23:31) (121/88 - 140/78)  RR: 18 (06-15-24 @ 23:31) (18 - 18)  SpO2: 99% (06-15-24 @ 23:31) (98% - 100%)    Physical Examination: Neurologic Exam:  Mental status - Awake, Alert, Oriented to person, place, and time. Speech fluent, repetition and naming intact. Follows simple and complex commands. Attention/concentration, recent and remote memory (including registration and recall), and fund of knowledge intact    Cranial nerves - PERRLA, VFF, EOMI, face sensation (V1-V3) intact b/l, facial strength intact without asymmetry b/l, hearing intact b/l, palate with symmetric elevation, trapezius 5/5 strength b/l, tongue midline on protrusion with full lateral movement    Motor - Normal bulk and tone throughout. No pronator drift.  Strength testing            Deltoid      Biceps      Triceps     Wrist Extension    Wrist Flexion     Interossei         R            5                 5               5                     5                              5                        5                 5  L             5                 5               5                     5                              5                        5                 5              Hip Flexion    Hip Extension    Knee Flexion    Knee Extension    Dorsiflexion    Plantar Flexion  R              5                           5                       5                           5                            5                          5  L              5                           5                        5                           5                            5                          5    Sensation - Light touch/temperature OR pain/vibration intact throughout    DTR's -             Biceps      Triceps     Brachioradialis      Patellar    Ankle    Toes/plantar response  R             2+             2+                  2+                       2+            2+                 Down  L              2+             2+                 2+                        2+           2+                 Down    Coordination - Finger to Nose intact b/l. No tremors appreciated    Gait and station - Normal casual gait. Romberg (-)    Labs:          CAPILLARY BLOOD GLUCOSE              CSF:                  Radiology:     Neurology - Consult Note    -  Spectra: [phone] ([facility]), [phone] ([facility])  -    HPI: Patient JENNIFER FERREIRA is a 23y (2000) woman with a PMHx significant for history of migraine recently dx (Follows with Dr. Murrell) in Jan 2024 who recently presented on 6/14 to [facility] for HA and left after migraine cocktail and somewhat improved HA without waiting for MRI brain. Pt today presented with persistent migraine headache, she describes as R sided, unilateral and pounding with a pressure sensation. Patient reports headache is 10/10 but she is very comfortable appearing and in no acute distress. HA has not worsened, but has been overall fairly constant throughout the day and unchanged for approximately 3 days now. Neurological examination unremarkable except decreased to light touch on R V1/V2/V3 compared to L. When she came on 6/14 she states that her headache was also associated with right-sided body numbness and tingling which has since resolved.  Patient endorses that 2 days ago she had her typical migraine presentation, but 1 day ago developed right-sided numbness and tingling which is new for her.  Spoke to her neurologist Dr. Murrell over the phone, who indicated that she should come to the ED.  Endorsed n/v. She denies any fevers, chills, dysuria. On my interview, however, she does report 1 week ago she was dx with Coxsackie virus at urgent care after found to have sore throat but this resolved. Pt denies any heavy lifting, sinusitis, OCP usage. 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    See further migraine hx as below:  At the age of [age] patient started getting one mild headache a year. However, in february the patient woke up with a 7/10 headache that has not gone away since. The headache is isolated to the right side and is typically behind her eye but sometimes extends to the full right side of her head. The patient notes that the headache is consistent was she goes to bed with it at night and wakes up with it in the morning. She has not noticed any facial droop or changes in facial expression. She also denies weakness. She notes that sometimes she sees black spots in her right eye. She also notes sensitivity to both light and sound. In the past four months has tried multiple medications with no relief. In February she tried risatriptan, tritriptan and sumitriptan. In March/April she switched to Ubrevly for two weeks with no relief. She then switched to Nurtec for two months again with no relief. Two weeks ago she received Botox for the first time which reduced her headache severity from a 7/10 to 5/10. Upon arrival to the ED, the patient received the headache cocktail of tylenol, ketoralac, and reglan. She says there was mild relief through using magnesium.     On my exam patient is sleeping, easily aroused, well-appearing, AAOx4 and cooperative.   States she took tylenol and fiorcet with no relief. Then at the ED she was given Ketamine, without any significant decrease in HA severity. Denies any vertigo, weakness, blurry vision at this time. Has some pain behind her eye which is classic for her typical HAs.    She reports that she is amenable to staying for MRI at this time. CT/CTA unremarkable.      Review of Systems:    NEUROLOGICAL: +As stated in HPI above  SKIN: No itching, burning, rashes, or lesions   All other review of systems is negative unless indicated above.    Allergies:  Augmentin (Unknown)      PMHx/PSHx/Family Hx: As above, otherwise see below   Migraine        Social Hx:  No current use of tobacco, alcohol, or illicit drugs  Lives with ***    Medications:  MEDICATIONS  (STANDING):  sodium chloride 0.9%. 1000 milliLiter(s) (100 mL/Hr) IV Continuous <Continuous>    MEDICATIONS  (PRN):      Vitals:  T(C): 36.5 (06-15-24 @ 23:31), Max: 36.7 (06-15-24 @ 22:56)  HR: 96 (06-15-24 @ 23:31) (96 - 118)  BP: 123/86 (06-15-24 @ 23:31) (121/88 - 140/78)  RR: 18 (06-15-24 @ 23:31) (18 - 18)  SpO2: 99% (06-15-24 @ 23:31) (98% - 100%)    Physical Examination: Neurologic Exam:  Mental status - Awake, Alert, Oriented to person, place, and time. Speech fluent, repetition and naming intact. Follows simple and complex commands. Attention/concentration, recent and remote memory (including registration and recall), and fund of knowledge intact    Cranial nerves - PERRLA, VFF, EOMI, face sensation (V1-V3) intact b/l, facial strength intact without asymmetry b/l, hearing intact b/l, palate with symmetric elevation, trapezius 5/5 strength b/l, tongue midline on protrusion with full lateral movement    Motor - Normal bulk and tone throughout. No pronator drift.  Strength testing            Deltoid      Biceps      Triceps     Wrist Extension    Wrist Flexion     Interossei         R            5                 5               5                     5                              5                        5                 5  L             5                 5               5                     5                              5                        5                 5              Hip Flexion    Hip Extension    Knee Flexion    Knee Extension    Dorsiflexion    Plantar Flexion  R              5                           5                       5                           5                            5                          5  L              5                           5                        5                           5                            5                          5    Sensation - Light touch/temperature OR pain/vibration intact throughout    DTR's -             Biceps      Triceps     Brachioradialis      Patellar    Ankle    Toes/plantar response  R             2+             2+                  2+                       2+            2+                 Down  L              2+             2+                 2+                        2+           2+                 Down    Coordination - Finger to Nose intact b/l. No tremors appreciated    Gait and station - Normal casual gait. Romberg (-)    Labs:          CAPILLARY BLOOD GLUCOSE              CSF:                  Radiology:

## 2024-06-16 NOTE — CONSULT NOTE ADULT - ASSESSMENT
Patient JENNIFER FERREIRA is a 23y (2000) woman with a PMHx significant for history of migraine (Follows with Dr. Murrell) who recently presented on 6/13/2024 to [facility] for HA and left after migraine cocktail and somewhat improved HA without waiting for MRI brain. Pt today presented with persistent migraine headache, she describes as R sided, unilateral and pounding with a pressure sensation. Patient reports headache is 10/10 but she is very comfortable appearing and in no acute distress. Neurological examination unremarkable except decreased to light touch on R V1/V2/V3 compared to L. When she came on 6/13 she states that her headache was also associated with right-sided body numbness and tingling which has since resolved.  Patient endorses that she had her typical migraine presentation, but right-sided numbness and tingling is new for her. Endorsed n/v. She denies any fevers, chills, dysuria. On my interview, however, she does report 1 week ago she was dx with Coxsackie virus at urgent care after found to have sore throat but this resolved. Pt denies any heavy lifting, sinusitis, OCP usage. 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    See further migraine hx as below:  At the age of [age] patient started getting one mild headache a year. However, in february the patient woke up with a 7/10 headache that has not gone away since. The headache is isolated to the right side and is typically behind her eye but sometimes extends to the full right side of her head. The patient notes that the headache is consistent was she goes to bed with it at night and wakes up with it in the morning. She has not noticed any facial droop or changes in facial expression. She also denies weakness. She notes that sometimes she sees stars in her right eye. She also notes sensitivity to both light and sound. In the past four months has tried multiple medications with no relief. In February she tried risatriptan, tritriptan and sumitriptan. In March/April she switched to Ubrevly for two weeks with no relief. She then switched to Nurtec for two months again with no relief. Two weeks ago she received Botox for the first time which reduced her headache severity from a 7/10 to 5/10. Upon arrival to the ED, the patient received the headache cocktail of tylenol, ketoralac, and reglan. She says there was mild relief through using magnesium.     On my exam patient is sleeping, easily aroused, well-appearing, AAOx4 and cooperative.   States she took tylenol and fiorcet with no relief. Then at the ED she was given Ketamine, without any significant decrease in HA severity. Denies any vertigo, weakness, blurry vision at this time. Has some pain behind her eye which is classic for her typical HAs.    She reports that she is amenable to staying for MRI at this time. CT/CTA unremarkable.    Impression: Episodes of unilateral pounding headache with accompanied photophobia and phonophobia with aura, likely intractable migraine, r/o other etiology    RECOMMENDATIONS:  [] admitted to CDU for MRI Brain/MRV  Abortive therapies:  [] First line: recommend migraine medications (to be given all together at the same time): ketorolac (Toradol) 30mg IV q8h PRN (or acetaminophen 1g IV q8h PRN), metoclopramide (Reglan) 10mg q8h PRN, diphenhydramine (Benadryl) 25mg IV q8h PRN. Please repeat at least 2-3 cycles for medications to be effective.  [] IV hydration, Mg 2g IV x1  [] Second line: recommend solumedrol 125mg IV PRN x1    Additional abortive therapies:  [] if migraine refractory to all abortive therapies above, recommend dihydroergotamine (DHE) 0.5mg IV PRN x1; prior to administration: check baseline EKG, do not give if hypertensive, check BP 15 minutes before and after administration; do not give within 24 hrs of a triptan.   [] consider sumatriptan 6mg IV PRN x1 if pt has aura/prior to full blown migraine. Contraindicated in pts with CAD, a history of stroke, PVD, and chronically uncontrolled HTN  [] consider compazine 10mg IV PRN x1 (however very sedating, monitor Qtc, avoid giving with other QT prolonging agents)  [] consider olanzapine 5mg IV PRN x1    Migraine education:  [] Encourage avoidance of common migraine triggers (artificial sweeteners, food preservative (MSG), aged cheese, skipping meals, change in wake/sleep cycle and intense physical exertion)  [] Encourage lifestyle changes that help migraine: physical exercise, fixed meal time, fixed sleep/wake cycle, avoid smoking and highly caffeinated products   [] Consider riboflavin 400mg daily, magnesium oxide 400mg daily, and CoQ10 for migraine prevention  [] F/u with her outpt neurologist Dr. Murrell when ready for dc      Case to be seen by attending.   Patient JENNIFER FERREIRA is a 23y (2000) woman with a PMHx significant for history of migraine (Follows with Dr. Murrell) who recently presented on 6/13/2024 to [facility] for HA and left after migraine cocktail and somewhat improved HA without waiting for MRI brain. Pt today presented with persistent migraine headache, she describes as R sided, unilateral and pounding with a pressure sensation. Patient reports headache is 10/10 but she is very comfortable appearing and in no acute distress. Neurological examination unremarkable except decreased to light touch on R V1/V2/V3 compared to L. When she came on 6/13 she states that her headache was also associated with right-sided body numbness and tingling which has since resolved.  Patient endorses that she had her typical migraine presentation, but right-sided numbness and tingling is new for her. Endorsed n/v. She denies any fevers, chills, dysuria. On my interview, however, she does report 1 week ago she was dx with Coxsackie virus at urgent care after found to have sore throat but this resolved. Pt denies any heavy lifting, sinusitis, OCP usage. 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    See further migraine hx as below:  At the age of [age] patient started getting one mild headache a year. However, in february the patient woke up with a 7/10 headache that has not gone away since. The headache is isolated to the right side and is typically behind her eye but sometimes extends to the full right side of her head. The patient notes that the headache is consistent was she goes to bed with it at night and wakes up with it in the morning. She has not noticed any facial droop or changes in facial expression. She also denies weakness. She notes that sometimes she sees stars in her right eye. She also notes sensitivity to both light and sound. In the past four months has tried multiple medications with no relief. In February she tried risatriptan, tritriptan and sumitriptan. In March/April she switched to Ubrevly for two weeks with no relief. She then switched to Nurtec for two months again with no relief. Two weeks ago she received Botox for the first time which reduced her headache severity from a 7/10 to 5/10. Upon arrival to the ED, the patient received the headache cocktail of tylenol, ketoralac, and reglan. She says there was mild relief through using magnesium.     On my exam patient is sleeping, easily aroused, well-appearing, AAOx4 and cooperative.   States she took tylenol and fiorcet with no relief. Then at the ED she was given Ketamine, without any significant decrease in HA severity. Denies any vertigo, weakness, blurry vision at this time. Has some pain behind her eye which is classic for her typical HAs.    She reports that she is amenable to staying for MRI at this time. CT/CTA unremarkable.    Impression: Episodes of unilateral pounding headache with accompanied photophobia and phonophobia with aura, likely intractable migraine, r/o other etiology    RECOMMENDATIONS:  [] Please check beta-hCG level - if normal then can give below migraine treatment  [] admitted to CDU for MRI Brain/MRV  Abortive therapies:  [] First line: recommend migraine medications (to be given all together at the same time): ketorolac (Toradol) 30mg IV q8h PRN (or acetaminophen 1g IV q8h PRN), metoclopramide (Reglan) 10mg q8h PRN, diphenhydramine (Benadryl) 25mg IV q8h PRN. Please repeat at least 2-3 cycles for medications to be effective.  [] IV hydration, Mg 2g IV x1  [] Second line: recommend solumedrol 125mg IV PRN x1    Additional abortive therapies:  [] if migraine refractory to all abortive therapies above, recommend dihydroergotamine (DHE) 0.5mg IV PRN x1; prior to administration: check baseline EKG, do not give if hypertensive, check BP 15 minutes before and after administration; do not give within 24 hrs of a triptan.   [] consider sumatriptan 6mg IV PRN x1 if pt has aura/prior to full blown migraine. Contraindicated in pts with CAD, a history of stroke, PVD, and chronically uncontrolled HTN  [] consider compazine 10mg IV PRN x1 (however very sedating, monitor Qtc, avoid giving with other QT prolonging agents)  [] consider olanzapine 5mg IV PRN x1    Migraine education:  [] Encourage avoidance of common migraine triggers (artificial sweeteners, food preservative (MSG), aged cheese, skipping meals, change in wake/sleep cycle and intense physical exertion)  [] Encourage lifestyle changes that help migraine: physical exercise, fixed meal time, fixed sleep/wake cycle, avoid smoking and highly caffeinated products   [] Consider riboflavin 400mg daily, magnesium oxide 400mg daily, and CoQ10 for migraine prevention  [] F/u with her outpt neurologist Dr. Murrell when ready for dc      Case to be seen by attending.

## 2024-06-16 NOTE — ED CDU PROVIDER SUBSEQUENT DAY NOTE - PROGRESS NOTE DETAILS
Pt continues to complain of headache.  Pending mri, neuro following. PA ALi; patient still having headaches, persistent, mild improvement after steroids, patient went for MRI, pending read and neuro re-evaluation.

## 2024-06-17 ENCOUNTER — NON-APPOINTMENT (OUTPATIENT)
Age: 24
End: 2024-06-17

## 2024-06-17 VITALS
SYSTOLIC BLOOD PRESSURE: 111 MMHG | DIASTOLIC BLOOD PRESSURE: 74 MMHG | HEART RATE: 118 BPM | TEMPERATURE: 97 F | OXYGEN SATURATION: 98 % | RESPIRATION RATE: 18 BRPM

## 2024-06-17 PROCEDURE — 99239 HOSP IP/OBS DSCHRG MGMT >30: CPT

## 2024-06-17 RX ORDER — SUMATRIPTAN SUCCINATE 4 MG/.5ML
6 INJECTION, SOLUTION SUBCUTANEOUS
Qty: 14 | Refills: 0
Start: 2024-06-17 | End: 2024-06-30

## 2024-06-17 RX ORDER — SUMATRIPTAN SUCCINATE 4 MG/.5ML
6 INJECTION, SOLUTION SUBCUTANEOUS ONCE
Refills: 0 | Status: COMPLETED | OUTPATIENT
Start: 2024-06-17 | End: 2024-06-17

## 2024-06-17 RX ADMIN — SUMATRIPTAN SUCCINATE 6 MILLIGRAM(S): 4 INJECTION, SOLUTION SUBCUTANEOUS at 01:34

## 2024-06-17 RX ADMIN — SUMATRIPTAN SUCCINATE 6 MILLIGRAM(S): 4 INJECTION, SOLUTION SUBCUTANEOUS at 02:04

## 2024-06-17 RX ADMIN — Medication 1 MILLIGRAM(S): at 09:09

## 2024-06-17 RX ADMIN — SUMATRIPTAN SUCCINATE 6 MILLIGRAM(S): 4 INJECTION, SOLUTION SUBCUTANEOUS at 09:09

## 2024-06-17 RX ADMIN — Medication 0.5 MILLIGRAM(S): at 02:02

## 2024-06-17 RX ADMIN — SODIUM CHLORIDE 100 MILLILITER(S): 9 INJECTION INTRAMUSCULAR; INTRAVENOUS; SUBCUTANEOUS at 01:29

## 2024-06-17 NOTE — ED CDU PROVIDER SUBSEQUENT DAY NOTE - CLINICAL SUMMARY MEDICAL DECISION MAKING FREE TEXT BOX
23yoF PMH of migraines, recent CDU stay and left prior to MRI, representing with intractable migraine.   Pt recv'd ketamine and toradol/tylenol in ED with minimal relief. plan for CDU for migraine cocktails, MRI brain/venogram and frequent reassessments.     Patient seen resting comfortably and in no acute distress.  Patient given symptomatic relief with sumatriptan and Ativan.  MRIs negative. Spoke with neuro, patient cleared for discharge with outpatient follow-up with Dr. Murrell. 23yoF PMH of migraines, recent CDU stay and left prior to MRI, representing with intractable migraine.   Pt recv'd ketamine and toradol/tylenol in ED with minimal relief. plan for CDU for migraine cocktails, MRI brain/venogram and frequent reassessments.     Patient seen resting comfortably and in no acute distress.  Patient given symptomatic relief with sumatriptan and Ativan.  MRIs negative. Spoke with neuro, patient cleared for discharge with outpatient follow-up with Dr. Murrell.    Annemarie: 24yo who presents with headache, months of headache. Seen by neuro and attempted treatment of headache. Will work on pain control, review studies, consider new at home therapy. MRIs negative

## 2024-06-17 NOTE — ED CDU PROVIDER SUBSEQUENT DAY NOTE - PHYSICAL EXAMINATION
CONSTITUTIONAL:nontoxic appearing; in moderate distress  HEAD: Normocephalic, atraumatic;  EYES: PERRL, EOM intact, conjunctiva and sclera WNL;  NECK/LYMPH: Supple; non-tender;  CARD: Normal S1, S2; no murmurs, rubs, or gallops noted  RESP: Normal chest excursion with respiration; breath sounds clear and equal bilaterally; no wheezes, rhonchi, or rales noted  EXT/MS: moves all extremities;  SKIN: Normal for age and race; warm; dry; good turgor; no apparent lesions or exudate noted  NEURO: Awake, alert, oriented x 3, no gross deficits, CN II-XII grossly intact, no motor or sensory deficit noted  PSYCH: Normal mood; appropriate affect
CONSTITUTIONAL:nontoxic appearing; in moderate distress  HEAD: Normocephalic, atraumatic;  EYES: PERRL, EOM intact, conjunctiva and sclera WNL;  NECK/LYMPH: Supple; non-tender;  CARD: Normal S1, S2; no murmurs, rubs, or gallops noted  RESP: Normal chest excursion with respiration; breath sounds clear and equal bilaterally; no wheezes, rhonchi, or rales noted  EXT/MS: moves all extremities;  SKIN: Normal for age and race; warm; dry; good turgor; no apparent lesions or exudate noted  NEURO: Awake, alert, oriented x 3, no gross deficits, CN II-XII grossly intact, no motor or sensory deficit noted  PSYCH: Normal mood; appropriate affect

## 2024-06-17 NOTE — ED CDU PROVIDER SUBSEQUENT DAY NOTE - NS ED ATTENDING STATEMENT MOD
This was a shared visit with the JAY. I reviewed and verified the documentation.
This was a shared visit with the JAY. I reviewed and verified the documentation.

## 2024-06-17 NOTE — ED CDU PROVIDER DISPOSITION NOTE - ATTENDING APP SHARED VISIT CONTRIBUTION OF CARE
I Bimal Sharpe MD have personally performed a face to face diagnostic evaluation on this patient.  I have reviewed the ACP note and agree with the history, exam, and plan of care, except as noted.   My medical decison making and observations are found above.  The patient is stable for D/C from CDU.  Lungs clear, abd soft, nl neuro exam.

## 2024-06-17 NOTE — ED CDU PROVIDER DISPOSITION NOTE - DISCHARGE REVIEW MATERIAL PRESENTED
Dr. Izaguirre, Patient due for cscreening olonoscopy, S/P AVR with tissue valve 3/24/21, Afib on coumadin, can she hold coumadin for colonoscopy and is bridging needed?  Thanks, RS
.

## 2024-06-17 NOTE — ED CDU PROVIDER SUBSEQUENT DAY NOTE - HISTORY
23yoF PMH of migraines, recent CDU stay and left prior to MRI, representing with intractable migraine. endorses R sided headache, throbbing, a/w "haziness" to her R eye. present upon awakening. tried fioricet and ibuprofen with no relief. Follows with neuro Dr. Murrell outpatient, has tried triptans, nyrtec, ubrelvy with no relief. no fevers, recent illness, neck stiffness. does endorse photosensitivity. no OCP use, no recent travel. no recent head trauma. Pt recv'd ketamine and toradol/tylenol in ED with minimal relief. plan for CDU for migraine cocktails, MRI brain/venogram and frequent reassessments. case d/w neuro.  in interim, pt evaluated by neuro, agree with plan, no further recs. pt given additional analgesics
23yoF PMH of migraines, recent CDU stay and left prior to MRI, representing with intractable migraine.   Pt recv'd ketamine and toradol/tylenol in ED with minimal relief. plan for CDU for migraine cocktails, MRI brain/venogram and frequent reassessments. case d/w neuro    Interval Hx: Patient seen resting comfortably and in no acute distress.  Patient given symptomatic relief with sumatriptan and Ativan.  MRIs negative. Spoke with neuro, patient cleared for discharge with outpatient follow-up with Dr. Murrell.

## 2024-06-17 NOTE — ED CDU PROVIDER DISPOSITION NOTE - NSFOLLOWUPINSTRUCTIONS_ED_ALL_ED_FT
Advance activity as tolerated.  Continue all previously prescribed medications as directed unless otherwise instructed.  Take Tylenol 650mg (Two 325 mg pills) every 4-6 hours as needed for pain or fevers.  Administer Imitrex 6 mg subcutaneous at onset of migraine headache.  You may take another dose after greater than 1 hour after initial dose for any persistent headache.  DO NOT TAKE MORE THAN 2 DOSES IN 24 HOURS.  Follow up with your primary care physician and neurology (referral list provided or you may follow up in the clinic, please call [phone])  in 48-72 hours- bring copies of your results.  Return to the Emergency Department for worsening or persistent symptoms, including but not limited to worsening/persistent headache, numbness, weakness, changes in vision or hearing, dizziness, difficulty walking, falls, slurred speech, neck stiffness, fevers, lightheadedness and/or ANY NEW OR CONCERNING SYMPTOMS. If you have issues obtaining follow up, please call: [phone] to obtain a doctor or specialist who takes your insurance in your area.  You may call [phone] to make an appointment with the internal medicine clinic.    ACUTE HEADACHE - General Information    Acute Headache    WHAT YOU NEED TO KNOW:    What is an acute headache? An acute headache is pain or discomfort that may start suddenly and get worse quickly. You may have an acute headache only when you feel stress or eat certain foods. Other acute headache pain can happen every day, and sometimes several times a day.    What are the most common types of acute headache?    Tension headache is the most common type of headache. These headaches typically occur in the late afternoon and go away by evening. The pain is usually mild or moderate. You may have problems tolerating bright light or loud noise. The pain is usually across the forehead or in the back of the head, often only on one side. These headaches may occur every day.    Migraine headaches cause moderate or severe pain. The headache generally lasts from 1 to 3 days and tends to come back. Pain is usually on only one side, but it may change sides. Migraines often occur in the temple, the back of the head, or behind the eye. The pain may throb or be sharp and steady.    A migraine with aura means you see or feel something before a migraine. You may see a small spot surrounded by bright zigzag lines. Other signs or symptoms may follow the aura.    Cluster headache pain is usually only on one side. It often causes severe pain, and can last for 30 minutes to 2 hours. These headaches may occur 1 or 2 times each day, more often at night. The pain may wake you.  What causes acute headaches? The cause of your headache may not be known. The following can trigger a headache:    Stress or tension, hours or even days after stressful events    Fatigue, a lack of sleep or changes in your usual sleep pattern, or a nap during the day    Menstruation, especially after pregnancy, or use of birth control pills or hormone replacement therapy    Food such as cured meats, artificial sweeteners, alcohol, dark chocolate, and MSG    Suddenly not having caffeine if you usually have larger amounts    A medical problem, such as an infection, tooth pain, neck or sinus pain, thyroid problems, or a tumor    A head injury  How is the type of acute headache diagnosed and treated? Your healthcare provider will ask you to describe your pain and rate it on a scale from 1 to 10. Tell the provider how often you have headaches and how long they last. Also describe any other symptoms you have along with headaches, such as dizziness or blurred vision. You may need tests including a CT scan to make sure there is not a leak in any blood vessels.    Medicines may be given to manage or prevent headaches. The medicine will depend on the type of acute headache you have. Do not wait until the pain is severe before you take your medicine. You may be able to take over-the-counter pain medicines as needed. Examples include NSAIDs and acetaminophen. Ask your healthcare provider which medicine is right for you. Ask how much to take and when to take it. Follow directions. These medicines can cause stomach bleeding or kidney or liver damage if not taken correctly.    Biofeedback may be used to help you manage stress. Electrodes (wires) are placed on your body and attached to a monitor. You will learn how to change stress reactions. For example, you learn to slow your heart rate when you become upset.    Cognitive behavior therapy, or stress management, may be used with other therapies to prevent headaches.  What can I do to manage my symptoms?    Apply heat or ice on the headache area. Use a heat or ice pack. For an ice pack, you can also put crushed ice in a plastic bag. Cover the pack or bag with a towel before you apply it to your skin. Ice and heat both help decrease pain, and heat also helps decrease muscle spasms. Apply heat for 20 to 30 minutes every 2 hours. Apply ice for 15 to 20 minutes every hour. Apply heat or ice for as long and for as many days as directed. You may alternate heat and ice.    Relax your muscles. Lie down in a comfortable position and close your eyes. Relax your muscles slowly. Start at your toes and work your way up your body.    Keep a record of your headaches. Write down when your headaches start and stop. Include your symptoms and what you were doing when the headache began. Record what you ate or drank for 24 hours before the headache started. Describe the pain and where it hurts. Keep track of what you did to treat your headache and if it worked.  What can I do to prevent an acute headache?    Avoid anything that triggers an acute headache. Examples include exposure to chemicals, going to high altitude, or not getting enough sleep. Create a regular sleep routine. Go to sleep at the same time and wake up at the same time each day. Do not use electronic devices before bedtime. These may trigger a headache or prevent you from sleeping well.    Do not smoke. Nicotine and other chemicals in cigarettes and cigars can trigger an acute headache or make it worse. Ask your healthcare provider for information if you currently smoke and need help to quit. E-cigarettes or smokeless tobacco still contain nicotine. Talk to your healthcare provider before you use these products.    Limit alcohol as directed. Alcohol can trigger an acute headache or make it worse. If you have cluster headaches, do not drink alcohol during an episode. For other types of headaches, ask your healthcare provider if it is safe for you to drink alcohol. Ask how much is safe for you to drink, and how often.    Exercise as directed. Exercise can reduce tension and help with headache pain. Aim for 30 minutes of physical activity on most days of the week. Your healthcare provider can help you create an exercise plan.    Eat a variety of healthy foods. Healthy foods include fruits, vegetables, low-fat dairy products, lean meats, fish, whole grains, and cooked beans. Your healthcare provider or dietitian can help you create meals plans if you need to avoid foods that trigger headaches.  When should I seek immediate care?    You have severe pain.    You have numbness or weakness on one side of your face or body.    You have a headache that occurs after a blow to the head, a fall, or other trauma.    You have a headache, are forgetful or confused, or have trouble speaking.    You have a headache, stiff neck, and a fever.  When should I call my doctor?    You have a constant headache and are vomiting.    You have a headache each day that does not get better, even after treatment.    You have changes in your headaches, or new symptoms that occur when you have a headache.    You have questions or concerns about your condition or care.  CARE AGREEMENT:    You have the right to help plan your care. Learn about your health condition and how it may be treated. Discuss treatment options with your healthcare providers to decide what care you want to receive. You always have the right to refuse treatment.    © [employer] 1973, 2023

## 2024-06-17 NOTE — ED CDU PROVIDER SUBSEQUENT DAY NOTE - ATTENDING APP SHARED VISIT CONTRIBUTION OF CARE
I Bimal Sharpe MD have personally performed a face to face diagnostic evaluation on this patient.  I have reviewed the ACP note and agree with the history, exam, and plan of care, except as noted.   My medical decison making and observations are found above.  The patient is stable for CDU.  Lungs clear, awake and alert
CDU MD FREDERICK:  I performed a face to face bedside interview with patient regarding history of present illness, review of symptoms and past medical history. I completed an independent physical exam.  I have discussed patient's plan of care with PA.   I agree with note as stated above, having amended the EMR as needed to reflect my findings. I have discussed the assessment and plan of care.  This includes during the time I functioned as the attending physician for this patient.    Pt with migraines to cdu for intractable ha.  Pending mr studies, neuro following.

## 2024-06-17 NOTE — ED CDU PROVIDER DISPOSITION NOTE - CARE PROVIDER_API CALL
Maria C Frederick  Neurology  [address] [place] [address], [facility] [place] [address]  Phone: [phone]  Fax: [phone]  Follow Up Time:

## 2024-06-17 NOTE — ED CDU PROVIDER DISPOSITION NOTE - CLINICAL COURSE
Patient is a 23-year-old female with past medical history of migraines presents with headache.  In the emergency department, patient had CT angio head and neck that was unremarkable.  Patient was evaluated by neurology.  Neurology recommends MRIs.  MRI with following impression: "1.  MR brain: No acute intracranial abnormality. No findings suspicious   for intracranial hypotension. Minimal sinus disease. 2.  MR venogram of the head: No dural venous sinus thrombosis. 3.  MR cervical/thoracic/lumbar spine: No findings suspicious for CSF leak. No significant spondylosis."  Neurology recommends no further inpatient workup.  Neurology recommending discharge.  Patient was deemed medically stable for discharge with instructions to follow-up with neurology.

## 2024-06-17 NOTE — ED CDU PROVIDER DISPOSITION NOTE - PATIENT PORTAL LINK FT
You can access the FollowMyHealth Patient Portal offered by [facility] by registering at the following website: [email or web address]. By joining [employer]’s FollowMyHealth portal, you will also be able to view your health information using other applications (apps) compatible with our system.

## 2024-06-24 ENCOUNTER — APPOINTMENT (OUTPATIENT)
Dept: NEUROLOGY | Facility: CLINIC | Age: 24
End: 2024-06-24
Payer: COMMERCIAL

## 2024-06-24 DIAGNOSIS — M26.609 UNSPECIFIED TEMPOROMANDIBULAR JOINT DISORDER: ICD-10-CM

## 2024-06-24 DIAGNOSIS — G43.E09 CHRONIC MIGRAINE WITH AURA, NOT INTRACTABLE, WITHOUT STATUS MIGRAINOSUS: ICD-10-CM

## 2024-06-24 PROCEDURE — 99215 OFFICE O/P EST HI 40 MIN: CPT | Mod: 95

## 2024-06-24 RX ORDER — ELETRIPTAN HYDROBROMIDE 40 MG/1
40 TABLET, FILM COATED ORAL
Qty: 9 | Refills: 4 | Status: ACTIVE | COMMUNITY
Start: 2024-06-24 | End: 1900-01-01

## 2024-06-24 RX ORDER — METHYLPREDNISOLONE 4 MG/1
4 TABLET ORAL
Qty: 1 | Refills: 0 | Status: ACTIVE | COMMUNITY
Start: 2024-06-24 | End: 1900-01-01

## 2024-06-25 ENCOUNTER — APPOINTMENT (OUTPATIENT)
Dept: NEUROLOGY | Facility: CLINIC | Age: 24
End: 2024-06-25
Payer: COMMERCIAL

## 2024-06-25 VITALS
WEIGHT: 99 LBS | OXYGEN SATURATION: 95 % | SYSTOLIC BLOOD PRESSURE: 114 MMHG | HEART RATE: 91 BPM | BODY MASS INDEX: 18.22 KG/M2 | HEIGHT: 62 IN | DIASTOLIC BLOOD PRESSURE: 78 MMHG

## 2024-06-25 DIAGNOSIS — M79.10 MYALGIA, UNSPECIFIED SITE: ICD-10-CM

## 2024-06-25 DIAGNOSIS — M79.2 NEURALGIA AND NEURITIS, UNSPECIFIED: ICD-10-CM

## 2024-06-25 PROCEDURE — 20553 NJX 1/MLT TRIGGER POINTS 3/>: CPT

## 2024-06-26 ENCOUNTER — APPOINTMENT (OUTPATIENT)
Dept: NEUROLOGY | Facility: CLINIC | Age: 24
End: 2024-06-26

## 2024-07-01 ENCOUNTER — APPOINTMENT (OUTPATIENT)
Dept: NEUROLOGY | Facility: CLINIC | Age: 24
End: 2024-07-01

## 2024-07-09 ENCOUNTER — APPOINTMENT (OUTPATIENT)
Dept: PEDIATRIC ALLERGY IMMUNOLOGY | Facility: CLINIC | Age: 24
End: 2024-07-09

## 2024-07-11 ENCOUNTER — APPOINTMENT (OUTPATIENT)
Dept: CARDIOLOGY | Facility: CLINIC | Age: 24
End: 2024-07-11

## 2024-07-18 RX ORDER — RIMEGEPANT SULFATE 75 MG/75MG
75 TABLET, ORALLY DISINTEGRATING ORAL DAILY
Qty: 16 | Refills: 3 | Status: ACTIVE | COMMUNITY
Start: 2024-07-15 | End: 1900-01-01

## 2024-07-26 RX ORDER — NABUMETONE 500 MG/1
500 TABLET, FILM COATED ORAL TWICE DAILY
Qty: 14 | Refills: 0 | Status: COMPLETED | COMMUNITY
Start: 2024-07-26 | End: 2024-08-02

## 2024-08-08 ENCOUNTER — APPOINTMENT (OUTPATIENT)
Dept: NEUROLOGY | Facility: CLINIC | Age: 24
End: 2024-08-08

## 2024-08-08 PROCEDURE — 99214 OFFICE O/P EST MOD 30 MIN: CPT | Mod: 95

## 2024-08-08 NOTE — HISTORY OF PRESENT ILLNESS
[Home] : at home, [unfilled] , at the time of the visit. [Medical Office: ([facility])___] : at the medical office located in  [Verbal consent obtained from patient] : the patient, [unfilled] [FreeTextEntry1] : Since  last visit: She feels that botox wearing off, she has more neck pain.  Headaches maybe less intense. She tried taking Nurtec every other day for a while for prevention but did not feel it did anything. She stopped a few days ago. She is going to start Nabumetone tomorrow. She still has headache all day everyday. She has tried cutting out dairy. She did see TMJ specialist and is going to get a bite guard.       HPI:  24 y/o woman hx of eosinophilic esophagitis. She has been seeing a physician through [facility]. She was recently at [facility] last week for status migrainosus. She had migraine cocktail which was not effective. They wanted to admit her but she went home. She ended up coming back and was admitted for 2 days. They did MRI brain. She received ketamine , toradol,   Mother says that she has been clenching, she has been complaining of jaw and teeth pain.     HAs began: She has had a headache every day since January of this year. No inciting event.  Ist headache was in 8th, then was once or twice a year, would take tylenol or Ibuprofen, wasn't severe.  Location: right side, behind the eye, temples, or vertex, neck and traps, has been on the left once or twice, not as severe on the left Quality: throbbing, sharp pain, pressure  Severity: 6-10/10 Disability: has been to [facility] twice for migraines, debilitating at times , has missed school, had to quit her job, has no social life Duration: constant Frequency: daily Temporal course: Time of day: Pain Free between Attacks: no pain free time Triggers: bright lights, loud noises Relieving factors:  sleeping , laying down in a dark Exacerbating factors: coffee, alcohol, exercise and Valsalva maneuver do not make headache worse Prodrome or postdrome:   Aura: none   Associated with menses: can be a little worse but not clearly   Ass'd Symptoms: Nausea + Vomiting + Photophobia + Phonophobia: + Osmophobia brain fog/difficulty concentrating + Irritability Allodynia - Blurred Vision - Neck pain + Dizziness   Autonomic features: none   Additional Social/Work History: Occupation: student Habits: Caffeine:  rarely  ETOH:  none      Tobacco:   none    Drugs: none Exercise: pilates Diet:  hydration: keeps hydrated Ophthalmologic: Sleep: not great, but sleeps at night Dental: clenching at night Sinus/Allergies:  seasonal allergies, flonase, no deviated septum Head Trauma: craniosynostosis as an infant Hypermobility: none Psychiatric: anxiety   Sex Active/Pregnancy planning: none                      contraception: Menstruation:  regular/irregular                                age of menarche:                                 age of menopause:    Treatment present: Acute:  Preventive: magnesium, feverfew, Coq10, riboflavin, Emgality- 2nd dose of Emgality,    Prior medications: Botox- done once 05/22/24, ketamine, Ubrelvy, rizatriptan, sumatriptan, toradol, reglan, Nurtec, eletriptan- side effects   Non-pharmacologic Tx: Physical therapy, acupuncture   Imaging: MRI brain at [facility] negative      Labs: CBC, CMP, magnesium normal at [facility]     FH: mother with migraine and TMJ

## 2024-08-08 NOTE — PHYSICAL EXAM
[FreeTextEntry1] : Gen: NAD, comfortable Resp: normal rate and rhythm HEENT: normocephalic, atraumatic, clear conjunctiva Neck: normal ROM cranail: , temporal tenderness,   Neuro: MS: AOX3, speech clear and fluent, following commands CN: pupils equal and round, EOMI, V1-V3 intact to LT, no facial asymmetry hearing intact to finger rub bilat, shoulder shrug intact bilat Motor: SWANSON, no drift bilat UE, able to stand, walks in front of camera, march in place, toe stand and heel stand Coor: FNF no ataxia, rapid alternating movements intact Sensation: intact to LT throughout

## 2024-08-22 ENCOUNTER — APPOINTMENT (OUTPATIENT)
Dept: NEUROLOGY | Facility: CLINIC | Age: 24
End: 2024-08-22
Payer: COMMERCIAL

## 2024-08-22 VITALS
HEART RATE: 102 BPM | RESPIRATION RATE: 16 BRPM | SYSTOLIC BLOOD PRESSURE: 108 MMHG | WEIGHT: 102 LBS | OXYGEN SATURATION: 99 % | HEIGHT: 62 IN | BODY MASS INDEX: 18.77 KG/M2 | DIASTOLIC BLOOD PRESSURE: 80 MMHG

## 2024-08-22 DIAGNOSIS — G43.E09 CHRONIC MIGRAINE WITH AURA, NOT INTRACTABLE, WITHOUT STATUS MIGRAINOSUS: ICD-10-CM

## 2024-08-22 PROCEDURE — 64615 CHEMODENERV MUSC MIGRAINE: CPT

## 2024-08-22 NOTE — PROCEDURE
[FreeTextEntry1] : onabotulinum toxin injections [FreeTextEntry2] : chronic migraine [FreeTextEntry3] :   Botox treatment date: 08/22/24 Last Botox treatment: 05/22/24     CURRENT (after receiving Botox) Frequency: HA days per month: daily HA hours per month:400 Severity:5/10 Quality: throbbing, neck pain Response to Treatment: Migraine frequency following Botox has been reduced more than ***%. With current treatment, there is a significant improvement in disability to DxADLlevel:08177 and level of function. In addition, there has been reduction in pain medications and/or triptan use and in ER visits. It is medically necessary to continue treatment.     BASELINE (prior to start of Botox) Frequency: HA days per month: daily HA hours per month: 720 Severity:6-10/10 Quality: Significant Disability (ADLs): Onset slow Location right side behind the eyes, temples, vertex Accompanying Signs & Symptoms: Nausea, vomiting, photophobia, photophobia, brain fog, neck pain   Triggers: Bright lights, loud noises     Botox procedure note: Risk and benefits reviewed with the patient who verbally agreed to the procedure.   Botox 200 units in 4 mL of normal saline, 185 units used and 15 units was wasted or lost in dilution NDC [id number] Lot number: [id number] Exp Date: 06/2026   INJECTION SITES (BILATERALLY) TOTAL AMOUNT (UNITS)  10 Procerus (one site) 5 Frontalis 20 Temporalis 40 Occipitalis 30 Cervical paraspinal muscle group 20 Trapezius 30   Follow the pain: 20 units divided bilateral trapezius, 10 units divided bilateral masseter   Patient tolerated procedure well. Treatment plan: [ ] First Botox treatment. Risks and benefits explained including infection, bleeding, neck weakness, ptosis, or difficulty swallowing or breathing. Patient verbally agrees to procedure.   [x ] The patient has had excellent results since starting Botox treatments. Prior to Botox, HA were 30d/month. After Botox treatments, HA are 30 d/month. There has been decrease utilization of pain/triptan medications; and reduction of emergency room visits. The patient has been monitored for medication overuse headache. There also is a >50% reduction in severity and duration. Prior to treatment, headaches were severe and lasted for days (720 hours per month). After treatment, headaches are mild and last 400 hours per month. The patient experienced significant disability prior to treatment (migraines were impacting daily function with missed activities/work/school). With current treatment, there is a significant improvement in disability (now mild disability - can participate in social/family/work activities) and level of function. It is medically necessary to continue treatment.     Other therapy trials for at least 2 months duration (The patient has been refractory to standard and usual conventional therapy including): Emgality, Nurtec    Botox obtained from specialty pharmacy.  Follow up in 3 months.

## 2024-09-24 ENCOUNTER — NON-APPOINTMENT (OUTPATIENT)
Age: 24
End: 2024-09-24

## 2024-10-09 ENCOUNTER — APPOINTMENT (OUTPATIENT)
Dept: NEUROLOGY | Facility: CLINIC | Age: 24
End: 2024-10-09

## 2024-10-10 ENCOUNTER — NON-APPOINTMENT (OUTPATIENT)
Age: 24
End: 2024-10-10

## 2024-11-22 ENCOUNTER — APPOINTMENT (OUTPATIENT)
Dept: NEUROLOGY | Facility: CLINIC | Age: 24
End: 2024-11-22

## 2024-11-22 DIAGNOSIS — G43.E09 CHRONIC MIGRAINE WITH AURA, NOT INTRACTABLE, WITHOUT STATUS MIGRAINOSUS: ICD-10-CM

## 2024-11-22 PROCEDURE — 64615 CHEMODENERV MUSC MIGRAINE: CPT

## 2024-11-22 RX ORDER — ERGOTAMINE TARTRATE AND CAFFEINE 1; 100 MG/1; MG/1
1-100 TABLET, FILM COATED ORAL
Qty: 6 | Refills: 0 | Status: ACTIVE | COMMUNITY
Start: 2024-11-22 | End: 1900-01-01

## 2024-12-24 ENCOUNTER — NON-APPOINTMENT (OUTPATIENT)
Age: 24
End: 2024-12-24

## 2025-01-10 ENCOUNTER — NON-APPOINTMENT (OUTPATIENT)
Age: 25
End: 2025-01-10

## 2025-01-10 DIAGNOSIS — G43.909 MIGRAINE, UNSPECIFIED, NOT INTRACTABLE, W/OUT STATUS MIGRAINOSUS: ICD-10-CM

## 2025-01-10 RX ORDER — INDOMETHACIN 75 MG/1
75 CAPSULE, EXTENDED RELEASE ORAL
Qty: 30 | Refills: 3 | Status: ACTIVE | COMMUNITY
Start: 2025-01-10 | End: 1900-01-01

## 2025-01-22 RX ORDER — NABUMETONE 750 MG/1
750 TABLET, FILM COATED ORAL TWICE DAILY
Qty: 14 | Refills: 0 | Status: ACTIVE | COMMUNITY
Start: 2025-01-22 | End: 1900-01-01

## 2025-02-21 ENCOUNTER — APPOINTMENT (OUTPATIENT)
Dept: NEUROLOGY | Facility: CLINIC | Age: 25
End: 2025-02-21
Payer: COMMERCIAL

## 2025-02-21 DIAGNOSIS — G43.E09 CHRONIC MIGRAINE WITH AURA, NOT INTRACTABLE, WITHOUT STATUS MIGRAINOSUS: ICD-10-CM

## 2025-02-21 PROCEDURE — 64615 CHEMODENERV MUSC MIGRAINE: CPT

## 2025-05-22 ENCOUNTER — APPOINTMENT (OUTPATIENT)
Dept: NEUROLOGY | Facility: CLINIC | Age: 25
End: 2025-05-22
Payer: COMMERCIAL

## 2025-05-22 VITALS
SYSTOLIC BLOOD PRESSURE: 110 MMHG | DIASTOLIC BLOOD PRESSURE: 86 MMHG | OXYGEN SATURATION: 99 % | WEIGHT: 100 LBS | HEART RATE: 86 BPM | HEIGHT: 62 IN | BODY MASS INDEX: 18.4 KG/M2

## 2025-05-22 DIAGNOSIS — R11.0 NAUSEA: ICD-10-CM

## 2025-05-22 DIAGNOSIS — G43.E09 CHRONIC MIGRAINE WITH AURA, NOT INTRACTABLE, WITHOUT STATUS MIGRAINOSUS: ICD-10-CM

## 2025-05-22 PROCEDURE — 64615 CHEMODENERV MUSC MIGRAINE: CPT

## 2025-05-22 RX ORDER — ONDANSETRON 4 MG/1
4 TABLET ORAL 3 TIMES DAILY
Qty: 36 | Refills: 3 | Status: ACTIVE | COMMUNITY
Start: 2025-05-22 | End: 1900-01-01

## 2025-06-13 ENCOUNTER — OFFICE (OUTPATIENT)
Dept: URBAN - METROPOLITAN AREA CLINIC 109 | Facility: CLINIC | Age: 25
Setting detail: OPHTHALMOLOGY
End: 2025-06-13
Payer: COMMERCIAL

## 2025-06-13 DIAGNOSIS — H16.223: ICD-10-CM

## 2025-06-13 PROCEDURE — 92012 INTRM OPH EXAM EST PATIENT: CPT | Performed by: OPTOMETRIST

## 2025-06-13 ASSESSMENT — REFRACTION_CURRENTRX
OS_SPHERE: -2.50
OD_SPHERE: -2.25
OS_OVR_VA: 20/
OD_OVR_VA: 20/
OS_VPRISM_DIRECTION: SV
OD_VPRISM_DIRECTION: SV

## 2025-06-13 ASSESSMENT — CONFRONTATIONAL VISUAL FIELD TEST (CVF)
OD_FINDINGS: FULL
OS_FINDINGS: FULL

## 2025-06-13 ASSESSMENT — REFRACTION_AUTOREFRACTION
OS_CYLINDER: -0.50
OS_SPHERE: -2.75
OD_AXIS: 110
OS_AXIS: 45
OD_CYLINDER: -0.50
OD_SPHERE: -2.25

## 2025-06-13 ASSESSMENT — SUPERFICIAL PUNCTATE KERATITIS (SPK)
OS_SPK: T
OD_SPK: T

## 2025-06-13 ASSESSMENT — TONOMETRY
OD_IOP_MMHG: 14
OS_IOP_MMHG: 16

## 2025-06-13 ASSESSMENT — REFRACTION_MANIFEST
OD_VA1: 20/20
OS_VA1: 20/20
OD_SPHERE: -2.25
OS_SPHERE: -2.50

## 2025-06-13 ASSESSMENT — VISUAL ACUITY
OD_BCVA: 20/20-2
OS_BCVA: 20/20

## 2025-08-27 ENCOUNTER — APPOINTMENT (OUTPATIENT)
Dept: NEUROLOGY | Facility: CLINIC | Age: 25
End: 2025-08-27
Payer: COMMERCIAL

## 2025-08-27 VITALS
SYSTOLIC BLOOD PRESSURE: 118 MMHG | DIASTOLIC BLOOD PRESSURE: 80 MMHG | WEIGHT: 100 LBS | HEART RATE: 94 BPM | BODY MASS INDEX: 18.4 KG/M2 | HEIGHT: 62 IN | OXYGEN SATURATION: 100 %

## 2025-08-27 PROCEDURE — 64615 CHEMODENERV MUSC MIGRAINE: CPT
